# Patient Record
Sex: FEMALE | Race: WHITE | Employment: FULL TIME | URBAN - METROPOLITAN AREA
[De-identification: names, ages, dates, MRNs, and addresses within clinical notes are randomized per-mention and may not be internally consistent; named-entity substitution may affect disease eponyms.]

---

## 2017-06-21 ENCOUNTER — PATIENT MESSAGE (OUTPATIENT)
Dept: OBGYN CLINIC | Facility: CLINIC | Age: 42
End: 2017-06-21

## 2017-06-21 ENCOUNTER — APPOINTMENT (OUTPATIENT)
Dept: LAB | Facility: REFERENCE LAB | Age: 42
End: 2017-06-21
Attending: OBSTETRICS & GYNECOLOGY
Payer: COMMERCIAL

## 2017-06-21 ENCOUNTER — OFFICE VISIT (OUTPATIENT)
Dept: OBGYN CLINIC | Facility: CLINIC | Age: 42
End: 2017-06-21

## 2017-06-21 VITALS — WEIGHT: 153 LBS | BODY MASS INDEX: 24.59 KG/M2 | HEIGHT: 66 IN

## 2017-06-21 DIAGNOSIS — Z32.01 PREGNANCY EXAMINATION OR TEST, POSITIVE RESULT: ICD-10-CM

## 2017-06-21 DIAGNOSIS — Z12.4 ROUTINE CERVICAL SMEAR: ICD-10-CM

## 2017-06-21 DIAGNOSIS — Z11.3 SCREENING EXAMINATION FOR VENEREAL DISEASE: ICD-10-CM

## 2017-06-21 DIAGNOSIS — Z01.419 WOMEN'S ANNUAL ROUTINE GYNECOLOGICAL EXAMINATION: ICD-10-CM

## 2017-06-21 DIAGNOSIS — Z32.01 PREGNANCY EXAMINATION OR TEST, POSITIVE RESULT: Primary | ICD-10-CM

## 2017-06-21 PROCEDURE — 84144 ASSAY OF PROGESTERONE: CPT

## 2017-06-21 PROCEDURE — 99385 PREV VISIT NEW AGE 18-39: CPT | Performed by: OBSTETRICS & GYNECOLOGY

## 2017-06-21 PROCEDURE — 87491 CHLMYD TRACH DNA AMP PROBE: CPT | Performed by: OBSTETRICS & GYNECOLOGY

## 2017-06-21 PROCEDURE — 87591 N.GONORRHOEAE DNA AMP PROB: CPT | Performed by: OBSTETRICS & GYNECOLOGY

## 2017-06-21 PROCEDURE — 84702 CHORIONIC GONADOTROPIN TEST: CPT

## 2017-06-21 PROCEDURE — 87624 HPV HI-RISK TYP POOLED RSLT: CPT | Performed by: OBSTETRICS & GYNECOLOGY

## 2017-06-21 PROCEDURE — 81025 URINE PREGNANCY TEST: CPT | Performed by: OBSTETRICS & GYNECOLOGY

## 2017-06-21 PROCEDURE — 36415 COLL VENOUS BLD VENIPUNCTURE: CPT

## 2017-06-21 PROCEDURE — 88175 CYTOPATH C/V AUTO FLUID REDO: CPT | Performed by: OBSTETRICS & GYNECOLOGY

## 2017-06-21 NOTE — PROGRESS NOTES
GYN H&P     2017  1:28 PM    CC: Patient is here to establish care    HPI: Patient is a 46year old  LMP 2017 EGA 5.0 weeks [facility] 2018 here for annual exam and confirmation of pregnancy. Office UCG positive today.  Cycles regular since off developed, well nourished, in no apparent distress  SKIN: no rashes, no lesions  HEENT: normal  LUNGS: respiration unlabored  CARDIOVASCULAR: no peripheral edema or varicosities, skin warm and dry  BREASTS: bilaterally nontender, no palpable masses, no nip

## 2017-06-23 ENCOUNTER — LAB ENCOUNTER (OUTPATIENT)
Dept: LAB | Facility: REFERENCE LAB | Age: 42
End: 2017-06-23
Attending: OBSTETRICS & GYNECOLOGY
Payer: COMMERCIAL

## 2017-06-23 DIAGNOSIS — Z32.01 PREGNANCY EXAMINATION OR TEST, POSITIVE RESULT: ICD-10-CM

## 2017-06-23 PROCEDURE — 36415 COLL VENOUS BLD VENIPUNCTURE: CPT | Performed by: OBSTETRICS & GYNECOLOGY

## 2017-06-23 PROCEDURE — 84144 ASSAY OF PROGESTERONE: CPT | Performed by: OBSTETRICS & GYNECOLOGY

## 2017-06-23 PROCEDURE — 84702 CHORIONIC GONADOTROPIN TEST: CPT | Performed by: OBSTETRICS & GYNECOLOGY

## 2017-07-11 ENCOUNTER — OFFICE VISIT (OUTPATIENT)
Dept: OBGYN CLINIC | Facility: CLINIC | Age: 42
End: 2017-07-11

## 2017-07-11 VITALS — BODY MASS INDEX: 25 KG/M2 | SYSTOLIC BLOOD PRESSURE: 110 MMHG | DIASTOLIC BLOOD PRESSURE: 60 MMHG | WEIGHT: 154.5 LBS

## 2017-07-11 DIAGNOSIS — O09.511: Primary | ICD-10-CM

## 2017-07-11 PROBLEM — O09.519 ELDERLY PRIMIGRAVIDA, ANTEPARTUM: Status: ACTIVE | Noted: 2017-07-11

## 2017-07-11 PROCEDURE — 99213 OFFICE O/P EST LOW 20 MIN: CPT | Performed by: OBSTETRICS & GYNECOLOGY

## 2017-07-11 NOTE — PROGRESS NOTES
Gerald Brochure is a 46year old female. HPI:   Patient presents with: Follow - Up: on labs Usn appointment scheduled for 07/13/2017      Here to review labs, await USN to confirm EDC and viability. No bleeding or cramping.  Serial quant HCG and P

## 2017-07-13 ENCOUNTER — ULTRASOUND ENCOUNTER (OUTPATIENT)
Dept: OBGYN CLINIC | Facility: CLINIC | Age: 42
End: 2017-07-13

## 2017-07-13 DIAGNOSIS — Z32.01 PREGNANCY EXAMINATION OR TEST, POSITIVE RESULT: ICD-10-CM

## 2017-07-13 PROCEDURE — 76817 TRANSVAGINAL US OBSTETRIC: CPT | Performed by: OBSTETRICS & GYNECOLOGY

## 2017-07-13 PROCEDURE — 76801 OB US < 14 WKS SINGLE FETUS: CPT | Performed by: OBSTETRICS & GYNECOLOGY

## 2017-07-21 ENCOUNTER — LAB ENCOUNTER (OUTPATIENT)
Dept: LAB | Facility: REFERENCE LAB | Age: 42
End: 2017-07-21
Attending: OBSTETRICS & GYNECOLOGY
Payer: COMMERCIAL

## 2017-07-21 ENCOUNTER — TELEPHONE (OUTPATIENT)
Dept: OBGYN CLINIC | Facility: CLINIC | Age: 42
End: 2017-07-21

## 2017-07-21 ENCOUNTER — OFFICE VISIT (OUTPATIENT)
Dept: OBGYN CLINIC | Facility: CLINIC | Age: 42
End: 2017-07-21

## 2017-07-21 VITALS
WEIGHT: 154.81 LBS | SYSTOLIC BLOOD PRESSURE: 120 MMHG | DIASTOLIC BLOOD PRESSURE: 60 MMHG | HEIGHT: 66 IN | BODY MASS INDEX: 24.88 KG/M2

## 2017-07-21 DIAGNOSIS — O09.511: ICD-10-CM

## 2017-07-21 DIAGNOSIS — Z36.9 ENCOUNTER FOR ANTENATAL SCREENING OF MOTHER: Primary | ICD-10-CM

## 2017-07-21 DIAGNOSIS — O09.511 ADVANCED MATERNAL AGE, 1ST PREGNANCY, FIRST TRIMESTER: ICD-10-CM

## 2017-07-21 DIAGNOSIS — Z36.9 ENCOUNTER FOR ANTENATAL SCREENING OF MOTHER: ICD-10-CM

## 2017-07-21 LAB
ANTIBODY SCREEN: NEGATIVE
APPEARANCE: CLEAR
BASOPHILS # BLD: 0 K/UL (ref 0–0.2)
BASOPHILS NFR BLD: 0 %
BILIRUB UR QL: NEGATIVE
BILIRUBIN: NEGATIVE
COLOR UR: YELLOW
EOSINOPHIL # BLD: 0.1 K/UL (ref 0–0.7)
EOSINOPHIL NFR BLD: 1 %
ERYTHROCYTE [DISTWIDTH] IN BLOOD BY AUTOMATED COUNT: 12 % (ref 11–15)
GLUCOSE (URINE DIPSTICK): NEGATIVE MG/DL
GLUCOSE UR-MCNC: NEGATIVE MG/DL
HCT VFR BLD AUTO: 39.4 % (ref 35–48)
HGB BLD-MCNC: 13.7 G/DL (ref 12–16)
HGB UR QL STRIP.AUTO: NEGATIVE
KETONES UR-MCNC: NEGATIVE MG/DL
LYMPHOCYTES # BLD: 1.5 K/UL (ref 1–4)
LYMPHOCYTES NFR BLD: 20 %
MCH RBC QN AUTO: 33.6 PG (ref 27–32)
MCHC RBC AUTO-ENTMCNC: 34.9 G/DL (ref 32–37)
MCV RBC AUTO: 96.5 FL (ref 80–100)
MONOCYTES # BLD: 0.4 K/UL (ref 0–1)
MONOCYTES NFR BLD: 6 %
MULTISTIX LOT#: NORMAL NUMERIC
NEUTROPHILS # BLD AUTO: 5.2 K/UL (ref 1.8–7.7)
NEUTROPHILS NFR BLD: 73 %
NITRITE UR QL STRIP.AUTO: NEGATIVE
PH UR: 6 [PH] (ref 5–8)
PH, URINE: 7 (ref 4.5–8)
PLATELET # BLD AUTO: 228 K/UL (ref 140–400)
PMV BLD AUTO: 9.5 FL (ref 7.4–10.3)
PROT UR-MCNC: NEGATIVE MG/DL
RBC # BLD AUTO: 4.08 M/UL (ref 3.7–5.4)
RBC #/AREA URNS AUTO: <1 /HPF
RUBV IGG SER-ACNC: >500 IU/ML
SP GR UR STRIP: 1.02 (ref 1–1.03)
SPECIFIC GRAVITY: 1.02 (ref 1–1.03)
UROBILINOGEN UR STRIP-ACNC: <2
UROBILINOGEN,SEMI-QN: 0.2 MG/DL (ref 0–1.9)
VIT C UR-MCNC: NEGATIVE MG/DL
WBC # BLD AUTO: 7.2 K/UL (ref 4–11)
WBC #/AREA URNS AUTO: 1 /HPF

## 2017-07-21 PROCEDURE — 36415 COLL VENOUS BLD VENIPUNCTURE: CPT | Performed by: OBSTETRICS & GYNECOLOGY

## 2017-07-21 PROCEDURE — 86850 RBC ANTIBODY SCREEN: CPT | Performed by: OBSTETRICS & GYNECOLOGY

## 2017-07-21 PROCEDURE — 81001 URINALYSIS AUTO W/SCOPE: CPT | Performed by: OBSTETRICS & GYNECOLOGY

## 2017-07-21 PROCEDURE — 86762 RUBELLA ANTIBODY: CPT | Performed by: OBSTETRICS & GYNECOLOGY

## 2017-07-21 PROCEDURE — 81002 URINALYSIS NONAUTO W/O SCOPE: CPT | Performed by: OBSTETRICS & GYNECOLOGY

## 2017-07-21 PROCEDURE — 81220 CFTR GENE COM VARIANTS: CPT | Performed by: OBSTETRICS & GYNECOLOGY

## 2017-07-21 PROCEDURE — 87340 HEPATITIS B SURFACE AG IA: CPT | Performed by: OBSTETRICS & GYNECOLOGY

## 2017-07-21 PROCEDURE — 85025 COMPLETE CBC W/AUTO DIFF WBC: CPT | Performed by: OBSTETRICS & GYNECOLOGY

## 2017-07-21 PROCEDURE — 87389 HIV-1 AG W/HIV-1&-2 AB AG IA: CPT | Performed by: OBSTETRICS & GYNECOLOGY

## 2017-07-21 PROCEDURE — 86780 TREPONEMA PALLIDUM: CPT | Performed by: OBSTETRICS & GYNECOLOGY

## 2017-07-21 PROCEDURE — 87086 URINE CULTURE/COLONY COUNT: CPT | Performed by: OBSTETRICS & GYNECOLOGY

## 2017-07-21 NOTE — TELEPHONE ENCOUNTER
----- Message from Deborah Polanco sent at 7/21/2017 10:43 AM CDT -----  Regarding: RE: Visit Follow-up Question  Contact: [phone]  Hi Dr. Andria Mackey,  Thanks for your time and info today. I'm glad Anthony Watters could be there.  I schedule the next visit for bryan continue taking? In college, I was diagnosed with depression and anxiety disorders. Does this put me at higher risk for postpartum depression? I haven't taken anti-depressants in over 10 years; after stopping those drugs, I pursued natural remedies.   I'm

## 2017-07-21 NOTE — TELEPHONE ENCOUNTER
Spoke to pt re: her recent appts that she scheduled. Pt scheduled her f/u appt a little too soon, rescheduled her for 8/21/17. Scheduled pt for labs as well.

## 2017-07-21 NOTE — PROGRESS NOTES
Here for NOB visit. Counseling and prenatal assessment done.  Discussed genetic screening options, nutrition, prenatal vitamins, activity, lifestyle habits, exercise, travel, and avoidence of extreme heat and toxins such as nicotine, alcohol, and pesticides

## 2017-07-24 LAB
HBV SURFACE AG SERPL QL IA: NONREACTIVE
HIV1+2 AB SERPL QL IA: NONREACTIVE
T PALLIDUM AB SER QL: NEGATIVE

## 2017-07-27 ENCOUNTER — NURSE ONLY (OUTPATIENT)
Dept: OBGYN CLINIC | Facility: CLINIC | Age: 42
End: 2017-07-27

## 2017-07-27 ENCOUNTER — APPOINTMENT (OUTPATIENT)
Dept: LAB | Facility: REFERENCE LAB | Age: 42
End: 2017-07-27
Attending: OBSTETRICS & GYNECOLOGY
Payer: COMMERCIAL

## 2017-07-27 DIAGNOSIS — Z36.9 ENCOUNTER FOR ANTENATAL SCREENING OF MOTHER: Primary | ICD-10-CM

## 2017-07-27 LAB — RH BLOOD TYPE: POSITIVE

## 2017-07-27 PROCEDURE — 86900 BLOOD TYPING SEROLOGIC ABO: CPT | Performed by: OBSTETRICS & GYNECOLOGY

## 2017-07-27 PROCEDURE — 86901 BLOOD TYPING SEROLOGIC RH(D): CPT | Performed by: OBSTETRICS & GYNECOLOGY

## 2017-07-27 NOTE — TELEPHONE ENCOUNTER
Pt c/o slight stiffness in hand after blood draw today. Had slight numbness, but has improved. Instructed to open and close hand to increase circulation, call back if increasing numbness or tingling.

## 2017-07-28 LAB
CYSTIC FIBROSIS ALLELE 1: NEGATIVE
CYSTIC FIBROSIS, ALLELE 2: NEGATIVE

## 2017-08-04 ENCOUNTER — TELEPHONE (OUTPATIENT)
Dept: OBGYN CLINIC | Facility: CLINIC | Age: 42
End: 2017-08-04

## 2017-08-12 ENCOUNTER — TELEPHONE (OUTPATIENT)
Dept: OBGYN CLINIC | Facility: CLINIC | Age: 42
End: 2017-08-12

## 2017-08-12 NOTE — TELEPHONE ENCOUNTER
Pt states that Dr. Georgia Colmenares recommended MES to see pt regarding Water Birth. Pt is thinking about having a waterbirth . Appt scheduled, address and parking info given. Pt verbalized understanding and agrees with plan.

## 2017-08-14 NOTE — TELEPHONE ENCOUNTER
Pt with some questions re: her lab bill. LM for pt that David Sheldon will call her back when she returns to the office on 8/16/17. Left message with David Sheldon.

## 2017-08-21 ENCOUNTER — OFFICE VISIT (OUTPATIENT)
Dept: OBGYN CLINIC | Facility: CLINIC | Age: 42
End: 2017-08-21

## 2017-08-21 VITALS
BODY MASS INDEX: 25.31 KG/M2 | DIASTOLIC BLOOD PRESSURE: 64 MMHG | HEIGHT: 66 IN | SYSTOLIC BLOOD PRESSURE: 120 MMHG | WEIGHT: 157.5 LBS

## 2017-08-21 DIAGNOSIS — N39.0 URINARY TRACT INFECTION WITHOUT HEMATURIA, SITE UNSPECIFIED: ICD-10-CM

## 2017-08-21 DIAGNOSIS — Z36.9 ENCOUNTER FOR ANTENATAL SCREENING OF MOTHER: Primary | ICD-10-CM

## 2017-08-21 LAB
BILIRUBIN: NEGATIVE
GLUCOSE (URINE DIPSTICK): NEGATIVE MG/DL
KETONES (URINE DIPSTICK): NEGATIVE MG/DL
MULTISTIX LOT#: NORMAL NUMERIC
NITRITE, URINE: NEGATIVE
PH, URINE: 5 (ref 4.5–8)
SPECIFIC GRAVITY: 1.02 (ref 1–1.03)
UROBILINOGEN,SEMI-QN: 0.2 MG/DL (ref 0–1.9)

## 2017-08-21 PROCEDURE — 87086 URINE CULTURE/COLONY COUNT: CPT | Performed by: OBSTETRICS & GYNECOLOGY

## 2017-08-21 PROCEDURE — 81002 URINALYSIS NONAUTO W/O SCOPE: CPT | Performed by: OBSTETRICS & GYNECOLOGY

## 2017-08-21 NOTE — PROGRESS NOTES
Has consultation with midwives this week. No ob complaints although did have some light pink spotting last week without cramping that has resolved. +FHTs today. Reports increased urinary frequency. Will check UC&S today.

## 2017-08-23 ENCOUNTER — OFFICE VISIT (OUTPATIENT)
Dept: OBGYN CLINIC | Facility: CLINIC | Age: 42
End: 2017-08-23

## 2017-08-23 DIAGNOSIS — Z71.89 PRENATAL CONSULT: Primary | ICD-10-CM

## 2017-08-23 NOTE — PROGRESS NOTES
Pt. denies any medical conditions. Desires CNM care. Midwifery care & philosophy discussed. Practice guidelines discussed. Pt is appropriate for RN visit.

## 2017-09-01 ENCOUNTER — NURSE ONLY (OUTPATIENT)
Dept: OBGYN CLINIC | Facility: CLINIC | Age: 42
End: 2017-09-01

## 2017-09-01 VITALS — BODY MASS INDEX: 25.72 KG/M2 | WEIGHT: 158.13 LBS | HEIGHT: 65.75 IN

## 2017-09-01 DIAGNOSIS — O09.522 AMA (ADVANCED MATERNAL AGE) MULTIGRAVIDA 35+, SECOND TRIMESTER: Primary | ICD-10-CM

## 2017-09-01 NOTE — PROGRESS NOTES
Pt is a Transfer of Care at 15.3 weeks. Pt is AMA and she has a cat. Pt does change the litter box. Encouraged pt not to change the litter box. 1 HR GTT, HA1C, TSH and TOXO titers ordered with missing NOB Labs.   Referral entered and faxed with order to

## 2017-09-13 ENCOUNTER — TELEPHONE (OUTPATIENT)
Dept: OBGYN CLINIC | Facility: CLINIC | Age: 42
End: 2017-09-13

## 2017-09-13 ENCOUNTER — INITIAL PRENATAL (OUTPATIENT)
Dept: OBGYN CLINIC | Facility: CLINIC | Age: 42
End: 2017-09-13

## 2017-09-13 VITALS
SYSTOLIC BLOOD PRESSURE: 104 MMHG | BODY MASS INDEX: 26 KG/M2 | DIASTOLIC BLOOD PRESSURE: 71 MMHG | WEIGHT: 160 LBS | HEART RATE: 67 BPM

## 2017-09-13 DIAGNOSIS — O09.522 ELDERLY MULTIGRAVIDA IN SECOND TRIMESTER: ICD-10-CM

## 2017-09-13 DIAGNOSIS — Z34.02 ENCOUNTER FOR SUPERVISION OF NORMAL FIRST PREGNANCY IN SECOND TRIMESTER: Primary | ICD-10-CM

## 2017-09-13 LAB
APPEARANCE: CLEAR
MULTISTIX LOT#: ABNORMAL NUMERIC
PH, URINE: 6 (ref 4.5–8)
SPECIFIC GRAVITY: 1 (ref 1–1.03)
UROBILINOGEN,SEMI-QN: 0.2 MG/DL (ref 0–1.9)

## 2017-09-13 NOTE — PROGRESS NOTES
Pt reports + FM. Has had on and off pinkish vaginal discharge last time 10 days ago after intercourse.   Reviewed with pt warning signs Level 2 u/s and MFM consult scheduled for 10/13  All questions answered and warning signs reviewed

## 2017-10-02 ENCOUNTER — TELEPHONE (OUTPATIENT)
Dept: OBGYN CLINIC | Facility: CLINIC | Age: 42
End: 2017-10-02

## 2017-10-02 NOTE — TELEPHONE ENCOUNTER
Msg left on VM requesting pt to complete the OB Labs ordered at her [facility] Education visit. Requested pt to call back with any further questions or concerns.

## 2017-10-05 ENCOUNTER — APPOINTMENT (OUTPATIENT)
Dept: LAB | Age: 42
End: 2017-10-05
Attending: ADVANCED PRACTICE MIDWIFE
Payer: COMMERCIAL

## 2017-10-05 DIAGNOSIS — O09.522 AMA (ADVANCED MATERNAL AGE) MULTIGRAVIDA 35+, SECOND TRIMESTER: ICD-10-CM

## 2017-10-05 PROCEDURE — 36415 COLL VENOUS BLD VENIPUNCTURE: CPT

## 2017-10-05 PROCEDURE — 82950 GLUCOSE TEST: CPT

## 2017-10-05 PROCEDURE — 86778 TOXOPLASMA ANTIBODY IGM: CPT

## 2017-10-05 PROCEDURE — 83036 HEMOGLOBIN GLYCOSYLATED A1C: CPT

## 2017-10-05 PROCEDURE — 86777 TOXOPLASMA ANTIBODY: CPT

## 2017-10-05 PROCEDURE — 84443 ASSAY THYROID STIM HORMONE: CPT

## 2017-10-05 PROCEDURE — 82306 VITAMIN D 25 HYDROXY: CPT

## 2017-10-05 PROCEDURE — 82105 ALPHA-FETOPROTEIN SERUM: CPT

## 2017-10-05 PROCEDURE — 86803 HEPATITIS C AB TEST: CPT

## 2017-10-09 ENCOUNTER — PATIENT MESSAGE (OUTPATIENT)
Dept: OBGYN CLINIC | Facility: CLINIC | Age: 42
End: 2017-10-09

## 2017-10-12 NOTE — PROGRESS NOTES
Outpatient Maternal-Fetal Medicine Consultation    Dear Ms. Triston Huang you for requesting ultrasound evaluation and maternal fetal medicine consultation on your patient Iker Hopkins.   As you are aware she is a 46year old female with a Singl tab for the detailed report.         DISCUSSION  During her visit we discussed and reviewed the following issues:  ADVANCED MATERNAL AGE    Background  I reviewed with the patient that pregnancies in women of advanced maternal age (28 or older at delivery) percent in women age 48 to 61.           Fetal Death        A decision analysis tool using data from the [place] [employer]  Database predicted a strategy of weekly antepartum testing and labor induction would lower the risk of unexplained fetal virgil amniocentesis). Non-invasive Pregnancy Testing (NIPT)  I reviewed current non-invasive screening options.  Currently non-invasive pregnancy testing (NIPT) offers the highest detection rate (with the lowest false positive rate) for the detection of fetal

## 2017-10-13 ENCOUNTER — ROUTINE PRENATAL (OUTPATIENT)
Dept: OBGYN CLINIC | Facility: CLINIC | Age: 42
End: 2017-10-13

## 2017-10-13 ENCOUNTER — HOSPITAL ENCOUNTER (OUTPATIENT)
Dept: PERINATAL CARE | Facility: HOSPITAL | Age: 42
Discharge: HOME | End: 2017-10-13
Attending: OBSTETRICS & GYNECOLOGY
Payer: COMMERCIAL

## 2017-10-13 VITALS
HEIGHT: 65.75 IN | WEIGHT: 161 LBS | DIASTOLIC BLOOD PRESSURE: 85 MMHG | BODY MASS INDEX: 26.19 KG/M2 | SYSTOLIC BLOOD PRESSURE: 129 MMHG | HEART RATE: 76 BPM

## 2017-10-13 VITALS
HEART RATE: 88 BPM | WEIGHT: 165 LBS | SYSTOLIC BLOOD PRESSURE: 134 MMHG | BODY MASS INDEX: 27 KG/M2 | DIASTOLIC BLOOD PRESSURE: 75 MMHG

## 2017-10-13 DIAGNOSIS — O09.519 ELDERLY PRIMIGRAVIDA, ANTEPARTUM: Primary | ICD-10-CM

## 2017-10-13 DIAGNOSIS — Z36.3 SCREENING, ANTENATAL, FOR MALFORMATION BY ULTRASOUND: ICD-10-CM

## 2017-10-13 DIAGNOSIS — Z34.02 ENCOUNTER FOR SUPERVISION OF NORMAL FIRST PREGNANCY IN SECOND TRIMESTER: Primary | ICD-10-CM

## 2017-10-13 DIAGNOSIS — O09.519 ELDERLY PRIMIGRAVIDA, ANTEPARTUM: ICD-10-CM

## 2017-10-13 PROBLEM — Z28.21 INFLUENZA VACCINE REFUSED: Status: RESOLVED | Noted: 2017-10-13 | Resolved: 2017-10-13

## 2017-10-13 PROBLEM — Z28.21 INFLUENZA VACCINE REFUSED: Status: ACTIVE | Noted: 2017-10-13

## 2017-10-13 PROCEDURE — 76811 OB US DETAILED SNGL FETUS: CPT | Performed by: OBSTETRICS & GYNECOLOGY

## 2017-10-13 PROCEDURE — 90471 IMMUNIZATION ADMIN: CPT | Performed by: ADVANCED PRACTICE MIDWIFE

## 2017-10-13 PROCEDURE — 99243 OFF/OP CNSLTJ NEW/EST LOW 30: CPT | Performed by: OBSTETRICS & GYNECOLOGY

## 2017-10-13 PROCEDURE — 90686 IIV4 VACC NO PRSV 0.5 ML IM: CPT | Performed by: ADVANCED PRACTICE MIDWIFE

## 2017-10-13 NOTE — PROGRESS NOTES
Just had Level 2 with MFM, normal. + FM. Denies pain or bleeding. Questions about recommendations and differences in care d/t AMA. Recommendation for growth u/s, NST's and delivery by 40 wks reviewed. Doing yoga regularly. Flu vaccine per nurse today.

## 2017-11-10 ENCOUNTER — ROUTINE PRENATAL (OUTPATIENT)
Dept: OBGYN CLINIC | Facility: CLINIC | Age: 42
End: 2017-11-10

## 2017-11-10 VITALS
HEART RATE: 87 BPM | DIASTOLIC BLOOD PRESSURE: 79 MMHG | BODY MASS INDEX: 27.51 KG/M2 | WEIGHT: 169.13 LBS | SYSTOLIC BLOOD PRESSURE: 127 MMHG | HEIGHT: 65.75 IN

## 2017-11-10 DIAGNOSIS — Z34.02 ENCOUNTER FOR SUPERVISION OF NORMAL FIRST PREGNANCY IN SECOND TRIMESTER: Primary | ICD-10-CM

## 2017-11-10 PROBLEM — Z32.01 PREGNANCY EXAMINATION OR TEST, POSITIVE RESULT: Status: RESOLVED | Noted: 2017-06-21 | Resolved: 2017-11-10

## 2017-11-10 PROBLEM — Z01.419 WOMEN'S ANNUAL ROUTINE GYNECOLOGICAL EXAMINATION: Status: RESOLVED | Noted: 2017-06-21 | Resolved: 2017-11-10

## 2017-11-10 PROBLEM — Z36.9 ENCOUNTER FOR ANTENATAL SCREENING OF MOTHER: Status: RESOLVED | Noted: 2017-07-21 | Resolved: 2017-11-10

## 2017-11-10 NOTE — PROGRESS NOTES
Signed up for tour and CBE class. Discussed doulas. Discussed relief for insomnia. REv warnings/when to call.   To do 3rd T labs with nv

## 2017-11-29 ENCOUNTER — ROUTINE PRENATAL (OUTPATIENT)
Dept: OBGYN CLINIC | Facility: CLINIC | Age: 42
End: 2017-11-29

## 2017-11-29 ENCOUNTER — APPOINTMENT (OUTPATIENT)
Dept: LAB | Facility: HOSPITAL | Age: 42
End: 2017-11-29
Attending: ADVANCED PRACTICE MIDWIFE
Payer: COMMERCIAL

## 2017-11-29 VITALS
HEART RATE: 91 BPM | SYSTOLIC BLOOD PRESSURE: 121 MMHG | WEIGHT: 173 LBS | BODY MASS INDEX: 28 KG/M2 | DIASTOLIC BLOOD PRESSURE: 84 MMHG

## 2017-11-29 DIAGNOSIS — F41.9 ANXIETY: ICD-10-CM

## 2017-11-29 DIAGNOSIS — Z34.83 ENCOUNTER FOR SUPERVISION OF OTHER NORMAL PREGNANCY IN THIRD TRIMESTER: Primary | ICD-10-CM

## 2017-11-29 DIAGNOSIS — Z34.02 ENCOUNTER FOR SUPERVISION OF NORMAL FIRST PREGNANCY IN SECOND TRIMESTER: ICD-10-CM

## 2017-11-29 PROCEDURE — 85027 COMPLETE CBC AUTOMATED: CPT

## 2017-11-29 PROCEDURE — 81002 URINALYSIS NONAUTO W/O SCOPE: CPT | Performed by: ADVANCED PRACTICE MIDWIFE

## 2017-11-29 PROCEDURE — 90715 TDAP VACCINE 7 YRS/> IM: CPT | Performed by: ADVANCED PRACTICE MIDWIFE

## 2017-11-29 PROCEDURE — 90471 IMMUNIZATION ADMIN: CPT | Performed by: ADVANCED PRACTICE MIDWIFE

## 2017-11-29 PROCEDURE — 82950 GLUCOSE TEST: CPT

## 2017-11-29 NOTE — PROGRESS NOTES
Active fetus  No signs signs of PTL. Reviewed S&S of PTL  28 week pkg reviewed. Pt reports increasing diffculty with sleeping and anxiety. Pt is open to therapy. Steffi Trammell navigator contacted. Warning signs reviewed  All questions answered.

## 2017-11-30 ENCOUNTER — TELEPHONE (OUTPATIENT)
Dept: OBGYN CLINIC | Facility: CLINIC | Age: 42
End: 2017-11-30

## 2017-12-11 ENCOUNTER — HOSPITAL ENCOUNTER (OUTPATIENT)
Dept: PERINATAL CARE | Facility: HOSPITAL | Age: 42
Discharge: HOME | End: 2017-12-11
Attending: OBSTETRICS & GYNECOLOGY
Payer: COMMERCIAL

## 2017-12-11 ENCOUNTER — ROUTINE PRENATAL (OUTPATIENT)
Dept: OBGYN CLINIC | Facility: CLINIC | Age: 42
End: 2017-12-11

## 2017-12-11 VITALS
SYSTOLIC BLOOD PRESSURE: 116 MMHG | HEART RATE: 80 BPM | WEIGHT: 175 LBS | HEIGHT: 65.75 IN | BODY MASS INDEX: 28.46 KG/M2 | DIASTOLIC BLOOD PRESSURE: 76 MMHG

## 2017-12-11 VITALS
RESPIRATION RATE: 16 BRPM | HEIGHT: 66 IN | WEIGHT: 173 LBS | SYSTOLIC BLOOD PRESSURE: 133 MMHG | BODY MASS INDEX: 27.8 KG/M2 | DIASTOLIC BLOOD PRESSURE: 82 MMHG | HEART RATE: 86 BPM

## 2017-12-11 DIAGNOSIS — O09.519 ELDERLY PRIMIGRAVIDA, ANTEPARTUM: Primary | ICD-10-CM

## 2017-12-11 DIAGNOSIS — Z34.03 ENCOUNTER FOR SUPERVISION OF NORMAL FIRST PREGNANCY IN THIRD TRIMESTER: Primary | ICD-10-CM

## 2017-12-11 DIAGNOSIS — O09.519 ELDERLY PRIMIGRAVIDA, ANTEPARTUM: ICD-10-CM

## 2017-12-11 PROCEDURE — 76805 OB US >/= 14 WKS SNGL FETUS: CPT | Performed by: OBSTETRICS & GYNECOLOGY

## 2017-12-11 PROCEDURE — 99243 OFF/OP CNSLTJ NEW/EST LOW 30: CPT | Performed by: OBSTETRICS & GYNECOLOGY

## 2017-12-11 NOTE — PROGRESS NOTES
Outpatient Maternal-Fetal Medicine Consultation    Dear Ms. Lu Cassidy you for requesting ultrasound evaluation and maternal fetal medicine consultation on your patient Emmett Beards.   As you are aware she is a 38year old female with a Singl rule out all structural and chromosomal abnormalities. IMPRESSION:  · IUP at 29w6d   · Scan consistent with dates  · Advanced maternal age, low risk [place] screen. Amnio declined.   · Small anterior fibroid    RECOMMENDATIONS:  · Continue care with

## 2017-12-12 NOTE — PROGRESS NOTES
Active baby. Normal ultrasound. Reviewed recommendations from M. NSTs 34 weekly. Bi-weekly testing at 38 weeks. Delivery at 40 weeks.

## 2017-12-28 ENCOUNTER — ROUTINE PRENATAL (OUTPATIENT)
Dept: OBGYN CLINIC | Facility: CLINIC | Age: 42
End: 2017-12-28

## 2017-12-28 VITALS
SYSTOLIC BLOOD PRESSURE: 129 MMHG | BODY MASS INDEX: 29 KG/M2 | WEIGHT: 178.81 LBS | HEART RATE: 85 BPM | DIASTOLIC BLOOD PRESSURE: 78 MMHG

## 2017-12-28 DIAGNOSIS — Z34.03 ENCOUNTER FOR SUPERVISION OF NORMAL FIRST PREGNANCY IN THIRD TRIMESTER: Primary | ICD-10-CM

## 2017-12-28 NOTE — PROGRESS NOTES
Did one day preparring for birth class. Planning to do hypnobirthing with Worthy Beni. Baby active. No signs PTL. Discussed weekly NST's starting with next weeks visit. Kick counts reviewed.

## 2018-01-04 ENCOUNTER — TELEPHONE (OUTPATIENT)
Dept: ADMINISTRATIVE | Age: 43
End: 2018-01-04

## 2018-01-04 NOTE — TELEPHONE ENCOUNTER
Spoke w/ pt, paid $25 over the phone, receipt emailed to pt ([email or web address]). Pt will send back FMLA+ FCR+ Release - pending.  NK

## 2018-01-11 ENCOUNTER — HOSPITAL ENCOUNTER (OUTPATIENT)
Facility: HOSPITAL | Age: 43
Discharge: HOME | End: 2018-01-11
Attending: ADVANCED PRACTICE MIDWIFE | Admitting: OBSTETRICS & GYNECOLOGY
Payer: COMMERCIAL

## 2018-01-11 ENCOUNTER — ROUTINE PRENATAL (OUTPATIENT)
Dept: OBGYN CLINIC | Facility: CLINIC | Age: 43
End: 2018-01-11

## 2018-01-11 ENCOUNTER — HOSPITAL ENCOUNTER (OUTPATIENT)
Dept: OBGYN CLINIC | Facility: HOSPITAL | Age: 43
Discharge: HOME | End: 2018-01-11
Attending: ADVANCED PRACTICE MIDWIFE
Payer: COMMERCIAL

## 2018-01-11 VITALS
WEIGHT: 185 LBS | BODY MASS INDEX: 30 KG/M2 | DIASTOLIC BLOOD PRESSURE: 75 MMHG | SYSTOLIC BLOOD PRESSURE: 112 MMHG | HEART RATE: 98 BPM

## 2018-01-11 VITALS — HEART RATE: 81 BPM | DIASTOLIC BLOOD PRESSURE: 76 MMHG | SYSTOLIC BLOOD PRESSURE: 120 MMHG

## 2018-01-11 DIAGNOSIS — Z34.03 ENCOUNTER FOR SUPERVISION OF NORMAL FIRST PREGNANCY IN THIRD TRIMESTER: Primary | ICD-10-CM

## 2018-01-11 PROBLEM — O34.10 UTERINE FIBROID DURING PREGNANCY, ANTEPARTUM: Status: ACTIVE | Noted: 2018-01-11

## 2018-01-11 PROBLEM — D25.9 UTERINE FIBROID DURING PREGNANCY, ANTEPARTUM: Status: ACTIVE | Noted: 2018-01-11

## 2018-01-11 LAB
APPEARANCE: CLEAR
MULTISTIX LOT#: NORMAL NUMERIC
PH, URINE: 6 (ref 4.5–8)
SPECIFIC GRAVITY: 1 (ref 1–1.03)
URINE-COLOR: YELLOW
UROBILINOGEN,SEMI-QN: 0 MG/DL (ref 0–1.9)

## 2018-01-11 PROCEDURE — 59025 FETAL NON-STRESS TEST: CPT | Performed by: ADVANCED PRACTICE MIDWIFE

## 2018-01-11 PROCEDURE — 81002 URINALYSIS NONAUTO W/O SCOPE: CPT | Performed by: ADVANCED PRACTICE MIDWIFE

## 2018-01-11 NOTE — NST
Nonstress Test   Patient: Odilia Minors    Gestation: 34w2d    NST: AMA       Variability: Moderate           Accelerations: Yes           Decelerations: None            Baseline: 125 BPM           Uterine Irritability: No           Contractions: No

## 2018-01-11 NOTE — PROGRESS NOTES
Doing well. No complaints. +FM. Meeting with Kev Gibson. Rev NST recommendations and IOL 39-40 weeks. Pt requesting information about where to read up on the data to support this - advised to read EBB, Mag[place] database, ACOG.   Rev warnings/when to ca

## 2018-01-18 ENCOUNTER — ROUTINE PRENATAL (OUTPATIENT)
Dept: OBGYN CLINIC | Facility: CLINIC | Age: 43
End: 2018-01-18

## 2018-01-18 ENCOUNTER — HOSPITAL ENCOUNTER (OUTPATIENT)
Facility: HOSPITAL | Age: 43
Discharge: HOME | End: 2018-01-18
Attending: ADVANCED PRACTICE MIDWIFE | Admitting: OBSTETRICS & GYNECOLOGY
Payer: COMMERCIAL

## 2018-01-18 ENCOUNTER — APPOINTMENT (OUTPATIENT)
Dept: OBGYN CLINIC | Facility: HOSPITAL | Age: 43
End: 2018-01-18
Payer: COMMERCIAL

## 2018-01-18 VITALS
WEIGHT: 185 LBS | BODY MASS INDEX: 30 KG/M2 | SYSTOLIC BLOOD PRESSURE: 127 MMHG | HEART RATE: 91 BPM | DIASTOLIC BLOOD PRESSURE: 81 MMHG

## 2018-01-18 VITALS — HEART RATE: 85 BPM | SYSTOLIC BLOOD PRESSURE: 124 MMHG | DIASTOLIC BLOOD PRESSURE: 76 MMHG

## 2018-01-18 DIAGNOSIS — Z34.03 ENCOUNTER FOR SUPERVISION OF NORMAL FIRST PREGNANCY IN THIRD TRIMESTER: Primary | ICD-10-CM

## 2018-01-18 LAB
APPEARANCE: CLEAR
MULTISTIX LOT#: NORMAL NUMERIC
PH, URINE: 5 (ref 4.5–8)
SPECIFIC GRAVITY: 1.01 (ref 1–1.03)
URINE-COLOR: YELLOW
UROBILINOGEN,SEMI-QN: 0 MG/DL (ref 0–1.9)

## 2018-01-18 PROCEDURE — 81002 URINALYSIS NONAUTO W/O SCOPE: CPT | Performed by: ADVANCED PRACTICE MIDWIFE

## 2018-01-18 PROCEDURE — 59025 FETAL NON-STRESS TEST: CPT | Performed by: ADVANCED PRACTICE MIDWIFE

## 2018-01-18 NOTE — NST
Nonstress Test   Patient: Keke Tamayo    Gestation: 35w2d    NST:       Variability: Moderate           Accelerations: Yes           Decelerations: None            Baseline: 125 BPM           Uterine Irritability: No           Contractions: Not pr

## 2018-01-18 NOTE — PROGRESS NOTES
No complaints, bit stuffy but no fever. NST today. Using Josph Simrichard as a . Rev GBS info, culture at nv. Pt with concerns about IOL.   Discussed data, methods to promote cervical ripening, sweeping membranes at term, and will discuss further at late

## 2018-01-20 ENCOUNTER — HOSPITAL ENCOUNTER (OUTPATIENT)
Facility: HOSPITAL | Age: 43
Setting detail: OBSERVATION
Discharge: HOME | End: 2018-01-20
Attending: ADVANCED PRACTICE MIDWIFE | Admitting: OBSTETRICS & GYNECOLOGY
Payer: COMMERCIAL

## 2018-01-20 VITALS
RESPIRATION RATE: 18 BRPM | TEMPERATURE: 98 F | DIASTOLIC BLOOD PRESSURE: 79 MMHG | HEART RATE: 80 BPM | SYSTOLIC BLOOD PRESSURE: 131 MMHG

## 2018-01-20 PROCEDURE — 59025 FETAL NON-STRESS TEST: CPT | Performed by: ADVANCED PRACTICE MIDWIFE

## 2018-01-21 NOTE — TRIAGE
[facility]      Triage Note    [place] Moat Patient Status:  Observation    1975 MRN [id number]   Location [address] [place] [address] Attending Soniya Arango, [address] [facility] Day # 0 PCP NAOMY Lakhani Patient denies history of fever with symptoms and VS in [facility] are WNL. Patient reports feeling fetal movement during NST. Kush Chu notified, patient is able to go home.   Patient educated about what to expect in terms of fetal movement at this point in pre

## 2018-01-24 ENCOUNTER — APPOINTMENT (OUTPATIENT)
Dept: OBGYN CLINIC | Facility: HOSPITAL | Age: 43
End: 2018-01-24
Payer: COMMERCIAL

## 2018-01-24 ENCOUNTER — HOSPITAL ENCOUNTER (OUTPATIENT)
Facility: HOSPITAL | Age: 43
Discharge: HOME | End: 2018-01-24
Attending: ADVANCED PRACTICE MIDWIFE | Admitting: OBSTETRICS & GYNECOLOGY
Payer: COMMERCIAL

## 2018-01-24 ENCOUNTER — ROUTINE PRENATAL (OUTPATIENT)
Dept: OBGYN CLINIC | Facility: CLINIC | Age: 43
End: 2018-01-24

## 2018-01-24 VITALS — HEART RATE: 83 BPM | SYSTOLIC BLOOD PRESSURE: 120 MMHG | DIASTOLIC BLOOD PRESSURE: 78 MMHG

## 2018-01-24 VITALS
DIASTOLIC BLOOD PRESSURE: 85 MMHG | HEART RATE: 89 BPM | BODY MASS INDEX: 30.27 KG/M2 | SYSTOLIC BLOOD PRESSURE: 129 MMHG | HEIGHT: 65.75 IN | WEIGHT: 186.13 LBS

## 2018-01-24 DIAGNOSIS — Z34.03 ENCOUNTER FOR SUPERVISION OF NORMAL FIRST PREGNANCY IN THIRD TRIMESTER: Primary | ICD-10-CM

## 2018-01-24 PROBLEM — Z34.90 PREGNANCY: Status: ACTIVE | Noted: 2018-01-24

## 2018-01-24 LAB
MULTISTIX LOT#: NORMAL NUMERIC
PH, URINE: 6.5 (ref 4.5–8)
SPECIFIC GRAVITY: 1 (ref 1–1.03)
URINE-COLOR: YELLOW
UROBILINOGEN,SEMI-QN: 0.2 MG/DL (ref 0–1.9)

## 2018-01-24 PROCEDURE — 59025 FETAL NON-STRESS TEST: CPT | Performed by: ADVANCED PRACTICE MIDWIFE

## 2018-01-24 PROCEDURE — 81002 URINALYSIS NONAUTO W/O SCOPE: CPT | Performed by: ADVANCED PRACTICE MIDWIFE

## 2018-01-25 NOTE — NST
Nonstress Test   Patient: Erlinda Hernandez    Gestation: 36w1d    NST:       Variability: Moderate           Accelerations: Yes           Decelerations: None            Baseline: 125 BPM           Uterine Irritability: No           Contractions: Irregu

## 2018-01-25 NOTE — PROGRESS NOTES
Active fetus Increasing BH contractions. Denies any leaking of fluid or bleeding. Reviewed S&S labor  Warning signs reviewed  All questions answered.

## 2018-02-01 ENCOUNTER — ROUTINE PRENATAL (OUTPATIENT)
Dept: OBGYN CLINIC | Facility: CLINIC | Age: 43
End: 2018-02-01

## 2018-02-01 ENCOUNTER — APPOINTMENT (OUTPATIENT)
Dept: OBGYN CLINIC | Facility: HOSPITAL | Age: 43
End: 2018-02-01
Payer: COMMERCIAL

## 2018-02-01 ENCOUNTER — HOSPITAL ENCOUNTER (OUTPATIENT)
Facility: HOSPITAL | Age: 43
Discharge: HOME | End: 2018-02-01
Attending: ADVANCED PRACTICE MIDWIFE | Admitting: OBSTETRICS & GYNECOLOGY
Payer: COMMERCIAL

## 2018-02-01 VITALS
HEART RATE: 90 BPM | WEIGHT: 188 LBS | DIASTOLIC BLOOD PRESSURE: 82 MMHG | BODY MASS INDEX: 31 KG/M2 | SYSTOLIC BLOOD PRESSURE: 122 MMHG

## 2018-02-01 VITALS — SYSTOLIC BLOOD PRESSURE: 127 MMHG | DIASTOLIC BLOOD PRESSURE: 79 MMHG | HEART RATE: 88 BPM

## 2018-02-01 DIAGNOSIS — Z34.03 ENCOUNTER FOR SUPERVISION OF NORMAL FIRST PREGNANCY IN THIRD TRIMESTER: Primary | ICD-10-CM

## 2018-02-01 LAB
APPEARANCE: CLEAR
MULTISTIX LOT#: NORMAL NUMERIC
PH, URINE: 7 (ref 4.5–8)
SPECIFIC GRAVITY: 1.01 (ref 1–1.03)
URINE-COLOR: YELLOW
UROBILINOGEN,SEMI-QN: 0 MG/DL (ref 0–1.9)

## 2018-02-01 PROCEDURE — 81002 URINALYSIS NONAUTO W/O SCOPE: CPT | Performed by: ADVANCED PRACTICE MIDWIFE

## 2018-02-01 PROCEDURE — 59025 FETAL NON-STRESS TEST: CPT | Performed by: ADVANCED PRACTICE MIDWIFE

## 2018-02-01 NOTE — PROGRESS NOTES
Has biweekly testing with BPP scheduled starting next week. Rev GBS neg. Pt considering declining 3rd T labs and doing on admission in labor. Discussed sweeping membranes starting with next visit. Pt will consider.    Is not sure whether she will con

## 2018-02-01 NOTE — NST
Nonstress Test   Patient: Emmett Cannon    Gestation: 37w2d    NST: ama       Variability: Moderate           Accelerations: Yes           Decelerations: None            Baseline: 135 BPM           Uterine Irritability: No           Contractions: Ir

## 2018-02-05 ENCOUNTER — ROUTINE PRENATAL (OUTPATIENT)
Dept: OBGYN CLINIC | Facility: CLINIC | Age: 43
End: 2018-02-05

## 2018-02-05 ENCOUNTER — TELEPHONE (OUTPATIENT)
Dept: OBGYN CLINIC | Facility: CLINIC | Age: 43
End: 2018-02-05

## 2018-02-05 ENCOUNTER — APPOINTMENT (OUTPATIENT)
Dept: OBGYN CLINIC | Facility: HOSPITAL | Age: 43
End: 2018-02-05
Attending: ADVANCED PRACTICE MIDWIFE
Payer: COMMERCIAL

## 2018-02-05 ENCOUNTER — HOSPITAL ENCOUNTER (OUTPATIENT)
Facility: HOSPITAL | Age: 43
Discharge: HOME | End: 2018-02-05
Attending: ADVANCED PRACTICE MIDWIFE | Admitting: OBSTETRICS & GYNECOLOGY
Payer: COMMERCIAL

## 2018-02-05 VITALS — DIASTOLIC BLOOD PRESSURE: 84 MMHG | SYSTOLIC BLOOD PRESSURE: 122 MMHG | HEART RATE: 80 BPM

## 2018-02-05 VITALS
BODY MASS INDEX: 31 KG/M2 | WEIGHT: 188 LBS | DIASTOLIC BLOOD PRESSURE: 80 MMHG | HEART RATE: 85 BPM | SYSTOLIC BLOOD PRESSURE: 127 MMHG

## 2018-02-05 DIAGNOSIS — Z34.03 ENCOUNTER FOR SUPERVISION OF NORMAL FIRST PREGNANCY IN THIRD TRIMESTER: Primary | ICD-10-CM

## 2018-02-05 LAB
MULTISTIX LOT#: NORMAL NUMERIC
PH, URINE: 6 (ref 4.5–8)
SPECIFIC GRAVITY: 1 (ref 1–1.03)
URINE-COLOR: YELLOW
UROBILINOGEN,SEMI-QN: 0.2 MG/DL (ref 0–1.9)

## 2018-02-05 PROCEDURE — 59025 FETAL NON-STRESS TEST: CPT | Performed by: ADVANCED PRACTICE MIDWIFE

## 2018-02-05 PROCEDURE — 81002 URINALYSIS NONAUTO W/O SCOPE: CPT | Performed by: ADVANCED PRACTICE MIDWIFE

## 2018-02-05 NOTE — PROGRESS NOTES
Feeling well, active baby. Still considering IV, reviewed risk associated with fibroid, will decide at next visit. To start twice weekly BPPs and NSTs next week.   Patient hesitant for IOL at 40 week, reviewed risks patient believes she will be comfortable

## 2018-02-05 NOTE — NST
Nonstress Test   Patient: Jericho Casey    Gestation: 37w6d    NST:       Variability: Moderate           Accelerations: Yes           Decelerations: None            Baseline: 125 BPM           Uterine Irritability: No           Contractions: Irregu

## 2018-02-05 NOTE — TELEPHONE ENCOUNTER
Disability form for [employer] received from pt via email. Logged for processing. Pt aware of 2nd charge will be billed.  NK

## 2018-02-08 ENCOUNTER — HOSPITAL ENCOUNTER (OUTPATIENT)
Dept: PERINATAL CARE | Facility: HOSPITAL | Age: 43
Discharge: HOME | End: 2018-02-08
Attending: OBSTETRICS & GYNECOLOGY
Payer: COMMERCIAL

## 2018-02-08 VITALS
SYSTOLIC BLOOD PRESSURE: 124 MMHG | HEART RATE: 104 BPM | HEIGHT: 66 IN | BODY MASS INDEX: 30.22 KG/M2 | DIASTOLIC BLOOD PRESSURE: 81 MMHG | WEIGHT: 188 LBS

## 2018-02-08 DIAGNOSIS — D25.9 UTERINE FIBROID DURING PREGNANCY, ANTEPARTUM: ICD-10-CM

## 2018-02-08 DIAGNOSIS — O34.10 UTERINE FIBROID DURING PREGNANCY, ANTEPARTUM: ICD-10-CM

## 2018-02-08 DIAGNOSIS — O09.519 ELDERLY PRIMIGRAVIDA, ANTEPARTUM: ICD-10-CM

## 2018-02-08 DIAGNOSIS — O09.519 ELDERLY PRIMIGRAVIDA, ANTEPARTUM: Primary | ICD-10-CM

## 2018-02-08 PROCEDURE — 76819 FETAL BIOPHYS PROFIL W/O NST: CPT | Performed by: OBSTETRICS & GYNECOLOGY

## 2018-02-08 PROCEDURE — 76815 OB US LIMITED FETUS(S): CPT | Performed by: OBSTETRICS & GYNECOLOGY

## 2018-02-08 PROCEDURE — 99243 OFF/OP CNSLTJ NEW/EST LOW 30: CPT | Performed by: OBSTETRICS & GYNECOLOGY

## 2018-02-08 PROCEDURE — 76805 OB US >/= 14 WKS SNGL FETUS: CPT | Performed by: OBSTETRICS & GYNECOLOGY

## 2018-02-08 NOTE — PROGRESS NOTES
Outpatient Maternal-Fetal Medicine Consultation     Dear Ms. Mcfadden,     Thank you for requesting ultrasound evaluation and maternal fetal medicine consultation on your patient Da Wheatley.   As you are aware she is a 38year old female with a Sin physicianMaria De Jesus Chauhan discussed that the fibroid is relatively small and not likely to interfere  with a normal labor and delivery. We discussed the use of pitocin in the third stage of labor to reduce retained placenta and maternal hemorrhage.       ROBERTO

## 2018-02-08 NOTE — ADDENDUM NOTE
Encounter addended by: Kee Dakins on: 2/8/2018  1:38 PM<BR>    Actions taken: Charge Capture section accepted

## 2018-02-12 ENCOUNTER — APPOINTMENT (OUTPATIENT)
Dept: OBGYN CLINIC | Facility: HOSPITAL | Age: 43
End: 2018-02-12
Payer: COMMERCIAL

## 2018-02-12 ENCOUNTER — HOSPITAL ENCOUNTER (OUTPATIENT)
Facility: HOSPITAL | Age: 43
Discharge: HOME | End: 2018-02-12
Attending: ADVANCED PRACTICE MIDWIFE | Admitting: OBSTETRICS & GYNECOLOGY
Payer: COMMERCIAL

## 2018-02-12 VITALS — SYSTOLIC BLOOD PRESSURE: 128 MMHG | DIASTOLIC BLOOD PRESSURE: 84 MMHG | HEART RATE: 72 BPM | RESPIRATION RATE: 18 BRPM

## 2018-02-12 PROCEDURE — 59025 FETAL NON-STRESS TEST: CPT | Performed by: ADVANCED PRACTICE MIDWIFE

## 2018-02-12 RX ORDER — DEXTROSE, SODIUM CHLORIDE, SODIUM LACTATE, POTASSIUM CHLORIDE, AND CALCIUM CHLORIDE 5; .6; .31; .03; .02 G/100ML; G/100ML; G/100ML; G/100ML; G/100ML
INJECTION, SOLUTION INTRAVENOUS
Status: DISCONTINUED
Start: 2018-02-12 | End: 2018-02-12

## 2018-02-12 NOTE — NST
Nonstress Test   Patient: Jericho Casey    Gestation: 38w6d    NST: ama        Variability: Moderate           Accelerations: Yes           Decelerations: None            Baseline: 120 BPM           Uterine Irritability: No           Contractions: I

## 2018-02-12 NOTE — TELEPHONE ENCOUNTER
Juan Vincent CNM    Please sign off on form:  -Highlight the patient and hit \"Chart\" button. -In Chart Review, w/in the Encounter tab - click 1 time on the Telephone call encounter for 2-5-18.  Scroll down the telephone encounter.  -Click \"scan on\" b

## 2018-02-13 ENCOUNTER — ROUTINE PRENATAL (OUTPATIENT)
Dept: OBGYN CLINIC | Facility: CLINIC | Age: 43
End: 2018-02-13

## 2018-02-13 VITALS
HEART RATE: 85 BPM | DIASTOLIC BLOOD PRESSURE: 78 MMHG | WEIGHT: 189 LBS | BODY MASS INDEX: 31 KG/M2 | SYSTOLIC BLOOD PRESSURE: 121 MMHG

## 2018-02-13 DIAGNOSIS — Z34.03 ENCOUNTER FOR SUPERVISION OF NORMAL FIRST PREGNANCY IN THIRD TRIMESTER: Primary | ICD-10-CM

## 2018-02-13 LAB
APPEARANCE: CLEAR
MULTISTIX LOT#: NORMAL NUMERIC
PH, URINE: 5 (ref 4.5–8)
SPECIFIC GRAVITY: 1 (ref 1–1.03)
URINE-COLOR: YELLOW
UROBILINOGEN,SEMI-QN: 0 MG/DL (ref 0–1.9)

## 2018-02-13 PROCEDURE — 81002 URINALYSIS NONAUTO W/O SCOPE: CPT | Performed by: ADVANCED PRACTICE MIDWIFE

## 2018-02-14 ENCOUNTER — HOSPITAL ENCOUNTER (OUTPATIENT)
Dept: PERINATAL CARE | Facility: HOSPITAL | Age: 43
Discharge: HOME | End: 2018-02-14
Attending: OBSTETRICS & GYNECOLOGY
Payer: COMMERCIAL

## 2018-02-14 DIAGNOSIS — O34.10 UTERINE FIBROID DURING PREGNANCY, ANTEPARTUM: ICD-10-CM

## 2018-02-14 DIAGNOSIS — D25.9 UTERINE FIBROID DURING PREGNANCY, ANTEPARTUM: ICD-10-CM

## 2018-02-14 DIAGNOSIS — O09.519 ELDERLY PRIMIGRAVIDA, ANTEPARTUM: ICD-10-CM

## 2018-02-14 DIAGNOSIS — O09.519 ELDERLY PRIMIGRAVIDA, ANTEPARTUM: Primary | ICD-10-CM

## 2018-02-14 PROCEDURE — 76805 OB US >/= 14 WKS SNGL FETUS: CPT | Performed by: OBSTETRICS & GYNECOLOGY

## 2018-02-14 PROCEDURE — 99243 OFF/OP CNSLTJ NEW/EST LOW 30: CPT | Performed by: OBSTETRICS & GYNECOLOGY

## 2018-02-14 PROCEDURE — 76819 FETAL BIOPHYS PROFIL W/O NST: CPT | Performed by: OBSTETRICS & GYNECOLOGY

## 2018-02-14 NOTE — PROGRESS NOTES
Outpatient Maternal-Fetal Medicine Consultation    Dear Ms. Guerrero Ruffing you for requesting ultrasound evaluation and maternal fetal medicine consultation on your patient David Freeman.   As you are aware she is a 38year old female with a Singl chromosomal abnormalities. IMPRESSION:  · IUP at 39w1d   · Scan consistent with dates  · Advanced maternal age, low risk [place] screen. Amnio declined. · Small anterior fibroid    RECOMMENDATIONS:  · Continue care with Ms. Bogdan CASILLAS's Twice wee

## 2018-02-14 NOTE — PROGRESS NOTES
Pt feeling well, no complaints. Had NST yesterday and is scheduled with MFM for repeat BPP tomorrow. Also has scheduled Acupuncture appt for later in week.     Reviewed with patient the MFM recommendation to deliver 39-40 weeks, and have previously provid

## 2018-02-14 NOTE — ADDENDUM NOTE
Encounter addended by: Jacque Lamb on: 2/14/2018  3:19 PM<BR>    Actions taken: Charge Capture section accepted

## 2018-02-15 ENCOUNTER — OFFICE VISIT (OUTPATIENT)
Dept: INTEGRATIVE MEDICINE | Facility: HOSPITAL | Age: 43
End: 2018-02-15
Attending: ADVANCED PRACTICE MIDWIFE

## 2018-02-15 DIAGNOSIS — Z3A.40 40 WEEKS GESTATION OF PREGNANCY: Primary | ICD-10-CM

## 2018-02-16 ENCOUNTER — APPOINTMENT (OUTPATIENT)
Dept: INTEGRATIVE MEDICINE | Facility: HOSPITAL | Age: 43
End: 2018-02-16
Attending: ADVANCED PRACTICE MIDWIFE

## 2018-02-19 ENCOUNTER — OFFICE VISIT (OUTPATIENT)
Dept: INTEGRATIVE MEDICINE | Facility: HOSPITAL | Age: 43
End: 2018-02-19
Attending: ADVANCED PRACTICE MIDWIFE

## 2018-02-19 ENCOUNTER — ROUTINE PRENATAL (OUTPATIENT)
Dept: OBGYN CLINIC | Facility: CLINIC | Age: 43
End: 2018-02-19

## 2018-02-19 VITALS — DIASTOLIC BLOOD PRESSURE: 83 MMHG | HEART RATE: 81 BPM | SYSTOLIC BLOOD PRESSURE: 127 MMHG

## 2018-02-19 DIAGNOSIS — Z3A.40 40 WEEKS GESTATION OF PREGNANCY: Primary | ICD-10-CM

## 2018-02-19 DIAGNOSIS — Z34.03 ENCOUNTER FOR SUPERVISION OF NORMAL FIRST PREGNANCY IN THIRD TRIMESTER: Primary | ICD-10-CM

## 2018-02-19 PROCEDURE — 81002 URINALYSIS NONAUTO W/O SCOPE: CPT | Performed by: ADVANCED PRACTICE MIDWIFE

## 2018-02-19 NOTE — PROGRESS NOTES
Feeling well, active baby. Has NST scheduled tomorrow. Reviewed labor promotion techniques, membranes swept today. Has been doing acupuncture. Reviewed AMA risks over 35 y/o and patient decides she will proceed with IOL at 41 weeks.   Reviewed methods of

## 2018-02-21 ENCOUNTER — HOSPITAL ENCOUNTER (OUTPATIENT)
Dept: PERINATAL CARE | Facility: HOSPITAL | Age: 43
Discharge: HOME | End: 2018-02-21
Attending: OBSTETRICS & GYNECOLOGY
Payer: COMMERCIAL

## 2018-02-21 ENCOUNTER — PATIENT MESSAGE (OUTPATIENT)
Dept: OBGYN CLINIC | Facility: CLINIC | Age: 43
End: 2018-02-21

## 2018-02-21 ENCOUNTER — HOSPITAL ENCOUNTER (OUTPATIENT)
Dept: PERINATAL CARE | Facility: HOSPITAL | Age: 43
Discharge: HOME | End: 2018-02-21
Attending: ADVANCED PRACTICE MIDWIFE
Payer: COMMERCIAL

## 2018-02-21 VITALS — DIASTOLIC BLOOD PRESSURE: 71 MMHG | SYSTOLIC BLOOD PRESSURE: 137 MMHG

## 2018-02-21 VITALS — SYSTOLIC BLOOD PRESSURE: 137 MMHG | DIASTOLIC BLOOD PRESSURE: 71 MMHG

## 2018-02-21 DIAGNOSIS — O48.0 POST TERM PREGNANCY OVER 40 WEEKS: ICD-10-CM

## 2018-02-21 DIAGNOSIS — O34.10 UTERINE FIBROID DURING PREGNANCY, ANTEPARTUM: ICD-10-CM

## 2018-02-21 DIAGNOSIS — D25.9 UTERINE FIBROID DURING PREGNANCY, ANTEPARTUM: ICD-10-CM

## 2018-02-21 DIAGNOSIS — O09.519 ELDERLY PRIMIGRAVIDA, ANTEPARTUM: ICD-10-CM

## 2018-02-21 DIAGNOSIS — O09.519 ELDERLY PRIMIGRAVIDA, ANTEPARTUM: Primary | ICD-10-CM

## 2018-02-21 PROCEDURE — 76819 FETAL BIOPHYS PROFIL W/O NST: CPT | Performed by: OBSTETRICS & GYNECOLOGY

## 2018-02-21 PROCEDURE — 99243 OFF/OP CNSLTJ NEW/EST LOW 30: CPT | Performed by: OBSTETRICS & GYNECOLOGY

## 2018-02-21 PROCEDURE — 59025 FETAL NON-STRESS TEST: CPT | Performed by: ADVANCED PRACTICE MIDWIFE

## 2018-02-21 NOTE — NST
Nonstress Test   Patient: Ignacio Sandra    Gestation: 40w1d    NST: ama     Variability: Moderate           Accelerations: Yes           Decelerations: None            Baseline: 120 BPM                       Contractions: Irregular

## 2018-02-21 NOTE — PROGRESS NOTES
Outpatient Maternal-Fetal Medicine Consultation     Dear Ms. Cheyanne Houston  Thank you for requesting ultrasound evaluation and maternal fetal medicine consultation on your patient [address] [facility] you are aware she is a 38year old female with a Sin  Amnio declined previously  · Small anterior fibroid     RECOMMENDATIONS:  · Continue care with Ms. Mcfadden  · Twice weekly testing - weekly NST and weekly BPP. · Delivery at 39-40 weeks.   We discussed ideal IOL timing and that the risk for  incr

## 2018-02-22 ENCOUNTER — OFFICE VISIT (OUTPATIENT)
Dept: INTEGRATIVE MEDICINE | Facility: HOSPITAL | Age: 43
End: 2018-02-22
Attending: ADVANCED PRACTICE MIDWIFE

## 2018-02-22 ENCOUNTER — ROUTINE PRENATAL (OUTPATIENT)
Dept: OBGYN CLINIC | Facility: CLINIC | Age: 43
End: 2018-02-22

## 2018-02-22 VITALS
BODY MASS INDEX: 31 KG/M2 | WEIGHT: 190 LBS | DIASTOLIC BLOOD PRESSURE: 85 MMHG | HEART RATE: 93 BPM | SYSTOLIC BLOOD PRESSURE: 134 MMHG

## 2018-02-22 DIAGNOSIS — Z3A.40 40 WEEKS GESTATION OF PREGNANCY: Primary | ICD-10-CM

## 2018-02-22 DIAGNOSIS — Z34.03 ENCOUNTER FOR SUPERVISION OF NORMAL FIRST PREGNANCY IN THIRD TRIMESTER: Primary | ICD-10-CM

## 2018-02-22 PROCEDURE — 81002 URINALYSIS NONAUTO W/O SCOPE: CPT | Performed by: ADVANCED PRACTICE MIDWIFE

## 2018-02-23 ENCOUNTER — OFFICE VISIT (OUTPATIENT)
Dept: INTEGRATIVE MEDICINE | Facility: HOSPITAL | Age: 43
End: 2018-02-23
Attending: ADVANCED PRACTICE MIDWIFE

## 2018-02-23 ENCOUNTER — TELEPHONE (OUTPATIENT)
Dept: OBGYN CLINIC | Facility: CLINIC | Age: 43
End: 2018-02-23

## 2018-02-23 DIAGNOSIS — Z3A.40 40 WEEKS GESTATION OF PREGNANCY: Primary | ICD-10-CM

## 2018-02-23 NOTE — TELEPHONE ENCOUNTER
Msg left on  advising pt of overdue labs that need to be completed. Requested pt go to the lab to complete the blood work at her earliest convenience.

## 2018-02-24 ENCOUNTER — APPOINTMENT (OUTPATIENT)
Dept: OBGYN CLINIC | Facility: HOSPITAL | Age: 43
End: 2018-02-24
Payer: COMMERCIAL

## 2018-02-24 ENCOUNTER — HOSPITAL ENCOUNTER (OUTPATIENT)
Facility: HOSPITAL | Age: 43
Discharge: HOME | End: 2018-02-24
Attending: ADVANCED PRACTICE MIDWIFE | Admitting: OBSTETRICS & GYNECOLOGY
Payer: COMMERCIAL

## 2018-02-24 ENCOUNTER — ROUTINE PRENATAL (OUTPATIENT)
Dept: OBGYN CLINIC | Facility: CLINIC | Age: 43
End: 2018-02-24

## 2018-02-24 VITALS
BODY MASS INDEX: 31 KG/M2 | WEIGHT: 190 LBS | SYSTOLIC BLOOD PRESSURE: 127 MMHG | HEART RATE: 73 BPM | DIASTOLIC BLOOD PRESSURE: 87 MMHG

## 2018-02-24 DIAGNOSIS — Z34.03 ENCOUNTER FOR SUPERVISION OF NORMAL FIRST PREGNANCY IN THIRD TRIMESTER: Primary | ICD-10-CM

## 2018-02-24 PROCEDURE — 81002 URINALYSIS NONAUTO W/O SCOPE: CPT | Performed by: ADVANCED PRACTICE MIDWIFE

## 2018-02-24 PROCEDURE — 59025 FETAL NON-STRESS TEST: CPT | Performed by: ADVANCED PRACTICE MIDWIFE

## 2018-02-24 NOTE — NST
Nonstress Test   Patient: Ignacio Coral    Gestation: 40w4d    NST: AMA, post dates       Variability: Moderate           Accelerations: Yes           Decelerations: None            Baseline: 130 BPM           Uterine Irritability: No           Cont

## 2018-02-26 ENCOUNTER — TELEPHONE (OUTPATIENT)
Dept: OBGYN CLINIC | Facility: CLINIC | Age: 43
End: 2018-02-26

## 2018-02-26 ENCOUNTER — ROUTINE PRENATAL (OUTPATIENT)
Dept: OBGYN CLINIC | Facility: CLINIC | Age: 43
End: 2018-02-26

## 2018-02-26 VITALS
WEIGHT: 191 LBS | BODY MASS INDEX: 31 KG/M2 | SYSTOLIC BLOOD PRESSURE: 126 MMHG | DIASTOLIC BLOOD PRESSURE: 83 MMHG | HEART RATE: 80 BPM

## 2018-02-26 DIAGNOSIS — Z34.03 ENCOUNTER FOR SUPERVISION OF NORMAL FIRST PREGNANCY IN THIRD TRIMESTER: Primary | ICD-10-CM

## 2018-02-26 PROCEDURE — 81002 URINALYSIS NONAUTO W/O SCOPE: CPT | Performed by: ADVANCED PRACTICE MIDWIFE

## 2018-02-26 NOTE — PROGRESS NOTES
Active baby. Discussed IOL procedures in detail. Desires to try homeopathic and castor oil. Discussed danger signs.

## 2018-02-26 NOTE — TELEPHONE ENCOUNTER
Pt has been doing acupuncture, pineapple, nipple stim. Wants to know if she should start back/blue cohosh. Offered appt for cerv sweep & to discuss w/ cnm. Pt agreed but would like cnm answer before appt just in case she needs to .  Will talk to [facility]

## 2018-02-26 NOTE — TELEPHONE ENCOUNTER
Per MBW pt can have conversation & sweep at appt today. Advised pt.  Pt verbalized an understanding & agrees w/ plan

## 2018-02-27 ENCOUNTER — HOSPITAL ENCOUNTER (OUTPATIENT)
Dept: OBGYN CLINIC | Facility: HOSPITAL | Age: 43
Discharge: HOME | End: 2018-02-27
Payer: COMMERCIAL

## 2018-02-27 ENCOUNTER — HOSPITAL ENCOUNTER (INPATIENT)
Facility: HOSPITAL | Age: 43
LOS: 4 days | Discharge: HOME | End: 2018-03-03
Attending: ADVANCED PRACTICE MIDWIFE | Admitting: OBSTETRICS & GYNECOLOGY
Payer: COMMERCIAL

## 2018-02-27 LAB
ANTIBODY SCREEN: NEGATIVE
BASOPHILS # BLD: 0 K/UL (ref 0–0.2)
BASOPHILS NFR BLD: 0 %
EOSINOPHIL # BLD: 0.1 K/UL (ref 0–0.7)
EOSINOPHIL NFR BLD: 1 %
ERYTHROCYTE [DISTWIDTH] IN BLOOD BY AUTOMATED COUNT: 12.9 % (ref 11–15)
HCT VFR BLD AUTO: 34.8 % (ref 35–48)
HGB BLD-MCNC: 12 G/DL (ref 12–16)
HIV1+2 AB SPEC QL IA.RAPID: NONREACTIVE
LYMPHOCYTES # BLD: 2.2 K/UL (ref 1–4)
LYMPHOCYTES NFR BLD: 20 %
MCH RBC QN AUTO: 33.6 PG (ref 27–32)
MCHC RBC AUTO-ENTMCNC: 34.5 G/DL (ref 32–37)
MCV RBC AUTO: 97.5 FL (ref 80–100)
MONOCYTES # BLD: 0.8 K/UL (ref 0–1)
MONOCYTES NFR BLD: 8 %
NEUTROPHILS # BLD AUTO: 7.9 K/UL (ref 1.8–7.7)
NEUTROPHILS NFR BLD: 72 %
PLATELET # BLD AUTO: 223 K/UL (ref 140–400)
PMV BLD AUTO: 10 FL (ref 7.4–10.3)
RBC # BLD AUTO: 3.57 M/UL (ref 3.7–5.4)
RH BLOOD TYPE: POSITIVE
WBC # BLD AUTO: 11 K/UL (ref 4–11)

## 2018-02-27 RX ORDER — SODIUM CHLORIDE 0.9 % (FLUSH) 0.9 %
2 SYRINGE (ML) INJECTION EVERY 8 HOURS
Status: DISCONTINUED | OUTPATIENT
Start: 2018-02-27 | End: 2018-03-01

## 2018-02-27 RX ORDER — 0.9 % SODIUM CHLORIDE 0.9 %
VIAL (ML) INJECTION
Status: DISPENSED
Start: 2018-02-27 | End: 2018-02-28

## 2018-02-27 RX ORDER — SODIUM CHLORIDE 0.9 % (FLUSH) 0.9 %
2 SYRINGE (ML) INJECTION AS NEEDED
Status: DISCONTINUED | OUTPATIENT
Start: 2018-02-27 | End: 2018-03-01

## 2018-02-28 PROBLEM — O48.0 POST TERM PREGNANCY OVER 40 WEEKS: Status: ACTIVE | Noted: 2018-02-28

## 2018-02-28 LAB — T PALLIDUM AB SER QL: NEGATIVE

## 2018-02-28 PROCEDURE — 59200 INSERT CERVICAL DILATOR: CPT | Performed by: ADVANCED PRACTICE MIDWIFE

## 2018-02-28 RX ORDER — LIDOCAINE HYDROCHLORIDE 10 MG/ML
30 INJECTION, SOLUTION EPIDURAL; INFILTRATION; INTRACAUDAL; PERINEURAL ONCE
Status: DISCONTINUED | OUTPATIENT
Start: 2018-02-28 | End: 2018-02-28 | Stop reason: HOSPADM

## 2018-02-28 RX ORDER — AMMONIA INHALANTS 0.04 G/.3ML
0.3 INHALANT RESPIRATORY (INHALATION) AS NEEDED
Status: DISCONTINUED | OUTPATIENT
Start: 2018-02-28 | End: 2018-02-28 | Stop reason: HOSPADM

## 2018-02-28 RX ORDER — SODIUM CHLORIDE, SODIUM LACTATE, POTASSIUM CHLORIDE, CALCIUM CHLORIDE 600; 310; 30; 20 MG/100ML; MG/100ML; MG/100ML; MG/100ML
INJECTION, SOLUTION INTRAVENOUS CONTINUOUS
Status: DISCONTINUED | OUTPATIENT
Start: 2018-02-28 | End: 2018-03-01

## 2018-02-28 RX ORDER — TRISODIUM CITRATE DIHYDRATE AND CITRIC ACID MONOHYDRATE 500; 334 MG/5ML; MG/5ML
30 SOLUTION ORAL AS NEEDED
Status: DISCONTINUED | OUTPATIENT
Start: 2018-02-28 | End: 2018-02-28 | Stop reason: HOSPADM

## 2018-02-28 RX ORDER — DEXTROSE, SODIUM CHLORIDE, SODIUM LACTATE, POTASSIUM CHLORIDE, AND CALCIUM CHLORIDE 5; .6; .31; .03; .02 G/100ML; G/100ML; G/100ML; G/100ML; G/100ML
125 INJECTION, SOLUTION INTRAVENOUS CONTINUOUS
Status: DISCONTINUED | OUTPATIENT
Start: 2018-02-28 | End: 2018-02-28 | Stop reason: HOSPADM

## 2018-02-28 RX ORDER — TERBUTALINE SULFATE 1 MG/ML
0.25 INJECTION, SOLUTION SUBCUTANEOUS AS NEEDED
Status: DISCONTINUED | OUTPATIENT
Start: 2018-02-28 | End: 2018-02-28 | Stop reason: HOSPADM

## 2018-02-28 RX ORDER — SODIUM CHLORIDE, SODIUM LACTATE, POTASSIUM CHLORIDE, CALCIUM CHLORIDE 600; 310; 30; 20 MG/100ML; MG/100ML; MG/100ML; MG/100ML
INJECTION, SOLUTION INTRAVENOUS
Status: COMPLETED
Start: 2018-02-28 | End: 2018-02-28

## 2018-02-28 RX ORDER — IBUPROFEN 600 MG/1
600 TABLET ORAL ONCE AS NEEDED
Status: DISCONTINUED | OUTPATIENT
Start: 2018-02-28 | End: 2018-02-28 | Stop reason: HOSPADM

## 2018-02-28 NOTE — H&P
[facility] Patient Status:  Inpatient    1975 MRN [id number]   Location [address] [place] [address] Attending [address] Day # 1 Gerry Horner MD Rash  Medications:    Prescriptions Prior to Admission:  Docosahexaenoic Acid (DHA OR) Take by mouth. Disp:  Rfl:  2/27/2018 at Unknown time   Probiotic Product (PROBIOTIC OR) Take by mouth.  Disp:  Rfl:  2/27/2018 at Unknown time   Prenatal Vit-DSS-Fe Cbn- 5. 33 uIU/mL 10/05/17 0932    HCV Nonreactive   Nonreactive 10/05/17 0932    Pap Smear        GC DNA        Chlamydia DNA        GTT 1 Hr 108 mg/dL   mg/dL 10/05/17 0932    Glucose Fasting        Glucose 1 Hr        Glucose 2 Hr        Glucose 3 Hr        H 07/21/17 0855    Cystic Fibrosis[165] (Required questions in OE to answer) 0 variants   07/21/17 0855    Cystic Fibrosis[165] (Required questions in OE to answer) Not Applicable   26/16/88 0748    Sickle Cell        24Hr Urine Protein        24Hr Urine Cr procedures and expectations were discussed in detail. All questions answered, all appropriate consents will be signed at the Hospital. Admission is planned for today.    Advised patient that Pitocin usually used in conjunction with Cook's catheter - she

## 2018-02-28 NOTE — PROGRESS NOTES
Cooks catheter placed @ 0000 by Bart Stauffer and Hola Bentley nurse midwife student. 60 cc of NS placed in both balloons.  SVE before insertion 1/40/-1    Payton Chirinos, 02/28/18, 12:30 AM

## 2018-02-28 NOTE — PROGRESS NOTES
[facility]    Labor Progress Note    Shayy Neumann Patient Status:  Inpatient    1975 MRN [id number]   Location [address]  Attending LINDA Lovell   Hosp Day # 1 PCP Cece Combs CNM tracing      2) Depression        Plan discussed with patient who verbalizes understanding and agreement.     David Simon  2/28/2018

## 2018-02-28 NOTE — PROGRESS NOTES
[facility]    Labor Progress Note    Priscilla Mendiola Patient Status:  Inpatient    1975 MRN [id number]   Location [address]  Attending Jilda Kawasaki, APN   Hosp Day # 1 PCP Fede Holman CNM

## 2018-03-01 PROCEDURE — 0HQ9XZZ REPAIR PERINEUM SKIN, EXTERNAL APPROACH: ICD-10-PCS | Performed by: ADVANCED PRACTICE MIDWIFE

## 2018-03-01 RX ORDER — SIMETHICONE 80 MG
80 TABLET,CHEWABLE ORAL 3 TIMES DAILY PRN
Status: DISCONTINUED | OUTPATIENT
Start: 2018-03-01 | End: 2018-03-03

## 2018-03-01 RX ORDER — HYDROCODONE BITARTRATE AND ACETAMINOPHEN 5; 325 MG/1; MG/1
2 TABLET ORAL EVERY 4 HOURS PRN
Status: DISCONTINUED | OUTPATIENT
Start: 2018-03-01 | End: 2018-03-03

## 2018-03-01 RX ORDER — AMMONIA INHALANTS 0.04 G/.3ML
0.3 INHALANT RESPIRATORY (INHALATION) AS NEEDED
Status: DISCONTINUED | OUTPATIENT
Start: 2018-03-01 | End: 2018-03-03

## 2018-03-01 RX ORDER — HYDROCODONE BITARTRATE AND ACETAMINOPHEN 5; 325 MG/1; MG/1
1 TABLET ORAL EVERY 4 HOURS PRN
Status: DISCONTINUED | OUTPATIENT
Start: 2018-03-01 | End: 2018-03-03

## 2018-03-01 RX ORDER — PRENATAL VIT,CAL 76/IRON/FOLIC 29 MG-1 MG
1 TABLET ORAL DAILY
Status: DISCONTINUED | OUTPATIENT
Start: 2018-03-01 | End: 2018-03-03

## 2018-03-01 RX ORDER — IBUPROFEN 600 MG/1
600 TABLET ORAL EVERY 6 HOURS
Status: DISCONTINUED | OUTPATIENT
Start: 2018-03-01 | End: 2018-03-03

## 2018-03-01 RX ORDER — ACETAMINOPHEN 325 MG/1
650 TABLET ORAL EVERY 4 HOURS PRN
Status: DISCONTINUED | OUTPATIENT
Start: 2018-03-01 | End: 2018-03-03

## 2018-03-01 RX ORDER — ONDANSETRON 2 MG/ML
4 INJECTION INTRAMUSCULAR; INTRAVENOUS EVERY 6 HOURS PRN
Status: DISCONTINUED | OUTPATIENT
Start: 2018-03-01 | End: 2018-03-03

## 2018-03-01 RX ORDER — DIAPER,BRIEF,INFANT-TODD,DISP
1 EACH MISCELLANEOUS EVERY 6 HOURS PRN
Status: DISCONTINUED | OUTPATIENT
Start: 2018-03-01 | End: 2018-03-03

## 2018-03-01 RX ORDER — BISACODYL 10 MG
10 SUPPOSITORY, RECTAL RECTAL ONCE AS NEEDED
Status: DISCONTINUED | OUTPATIENT
Start: 2018-03-01 | End: 2018-03-03

## 2018-03-01 RX ORDER — DOCUSATE SODIUM 100 MG/1
100 CAPSULE, LIQUID FILLED ORAL 2 TIMES DAILY
Status: DISCONTINUED | OUTPATIENT
Start: 2018-03-01 | End: 2018-03-03

## 2018-03-01 NOTE — PROGRESS NOTES
[facility]OSP - [facility]    Labor Progress Note    Yvonnie Bamberger Patient Status:  Inpatient    1975 MRN [id number]   Location [address]  Attending LINDA Dodson   Hosp Day # 2 PCP Mark Anthony Cleveland CNM Philip Degroot  3/1/2018

## 2018-03-01 NOTE — LACTATION NOTE
This note was copied from a baby's chart.   LACTATION NOTE - INFANT    Evaluation Type  Evaluation Type: Inpatient    Problems & Assessment  Problems Diagnosed or Identified: Sleepy    Feeding Assessment  Summary Current Feeding: Adlib;Breastfeeding exclusi

## 2018-03-01 NOTE — PROGRESS NOTES
Patient received into room 362 via wheelchair . Bedside report received from FAITH Morris.  Bed in locked and low position.  Side rails up x2.  Vitals signs within normal limits, fundus firm at U/U, lochia small, no clots noted.  Baby boy  present at beside,

## 2018-03-01 NOTE — PROGRESS NOTES
[facility]    OB/GYNE Progress Note      Westly Bushy Patient Status:  Inpatient    1975 MRN [id number]   Location [address]  Attending LINDA Perdue   Hosp Day # 1 PCP Moni Armijo CNM 07/21/2017   BLOODURINE Negative 07/21/2017   NITRITE NEG 02/26/2018   UROBILINOGEN <2.0 07/21/2017   JORDEN Jones (A) 07/21/2017   UASA Negative 07/21/2017            Assessment/Plan     Post term pregnancy over 40 weeks      Depression    Induction of lab

## 2018-03-01 NOTE — PLAN OF CARE
Problem: POSTPARTUM  Goal: Optimize infant feeding at the breast  INTERVENTIONS:  - Initiate breast feeding within first hour after birth. - Monitor effectiveness of current breast feeding efforts. - Assess support systems available to mother/family.   - for baby q1h until waking.

## 2018-03-01 NOTE — L&D DELIVERY NOTE
Steve Gannon  [[id number]]     Labor Events     labor?:  No    steroids?:  None   Cervical ripening date/time:  18 0000   Cervical ripening type:  Cervical Ripening Balloon   Antibiotics (enter # doses in comment):  none   Rupture date status:  Living   Apgar Scoring Key:     0 1 2    Skin color Blue or pale Acrocyanotic Completely pink    Heart rate Absent <100 bpm >100 bpm    Reflex irritability No response Grimace Cry or active withdrawal    Muscle tone Limp Some flexion Active motion

## 2018-03-01 NOTE — LACTATION NOTE
LACTATION NOTE - MOTHER      Evaluation Type: Inpatient    Problems identified  Problems identified: Knowledge deficit    Maternal history  Maternal history: Anxiety;Depression  Other/comment: fibroid    Breastfeeding goal  Breastfeeding goal: To maintain

## 2018-03-01 NOTE — PLAN OF CARE
Problem: POSTPARTUM  Goal: Experiences normal breast weaning course  INTERVENTIONS:  - Assess for and manage engorgement. - Instruct on breast care. - Provide comfort measures.   Outcome: Progressing

## 2018-03-01 NOTE — PROGRESS NOTES
PT C/O OF FEELING LIGHTHEADED AND HEART RACING WHILE UP TO BATHROOM. BLEEDING STABLE, HOWEVER, CLOTS NOTED IN TOILET. PT UNABLE TO VOID. PLACED BACK IN BED, VSS. STRAIGHT CATH COMPLETED WITH 500 ML OUT. UTERUS REMAINS U/1, MIDLINE, AND FIRM.   PT FEELS

## 2018-03-02 PROBLEM — D64.9 ANEMIA: Status: ACTIVE | Noted: 2018-03-02

## 2018-03-02 LAB
ERYTHROCYTE [DISTWIDTH] IN BLOOD BY AUTOMATED COUNT: 13.1 % (ref 11–15)
HCT VFR BLD AUTO: 28.9 % (ref 35–48)
HGB BLD-MCNC: 9.8 G/DL (ref 12–16)
MCH RBC QN AUTO: 33.6 PG (ref 27–32)
MCHC RBC AUTO-ENTMCNC: 33.9 G/DL (ref 32–37)
MCV RBC AUTO: 99 FL (ref 80–100)
PLATELET # BLD AUTO: 189 K/UL (ref 140–400)
PMV BLD AUTO: 9.9 FL (ref 7.4–10.3)
RBC # BLD AUTO: 2.92 M/UL (ref 3.7–5.4)
WBC # BLD AUTO: 15 K/UL (ref 4–11)

## 2018-03-02 RX ORDER — MELATONIN
325 2 TIMES DAILY WITH MEALS
Status: DISCONTINUED | OUTPATIENT
Start: 2018-03-02 | End: 2018-03-03

## 2018-03-02 NOTE — PROGRESS NOTES
[facility]    OB/GYNE Progress Note      Silvina Bates Patient Status:  Inpatient    1975 MRN [id number]   Location [facility] 3SE Attending Candance Ochoa, APN   Hosp Day # 3 PCP Dimitris Bone CNM         Subject NITRITE NEG 2018   UROBILINOGEN <2.0 2017   JORDEN Large (A) 2017   UASA Negative 2017       Assessment/Plan   40 you  day 1 PP  S/p   Anemia     Plan discussed with patient who verbalizes understanding and agreement.   Disch

## 2018-03-03 VITALS
OXYGEN SATURATION: 100 % | HEART RATE: 75 BPM | TEMPERATURE: 98 F | RESPIRATION RATE: 16 BRPM | DIASTOLIC BLOOD PRESSURE: 72 MMHG | BODY MASS INDEX: 30.7 KG/M2 | HEIGHT: 66 IN | SYSTOLIC BLOOD PRESSURE: 121 MMHG | WEIGHT: 191 LBS

## 2018-03-03 RX ORDER — PSEUDOEPHEDRINE HCL 30 MG
100 TABLET ORAL 2 TIMES DAILY PRN
Qty: 60 CAPSULE | Refills: 1 | Status: SHIPPED | OUTPATIENT
Start: 2018-03-03 | End: 2018-04-16

## 2018-03-03 RX ORDER — MELATONIN
325 2 TIMES DAILY WITH MEALS
Qty: 60 TABLET | Refills: 2 | Status: SHIPPED | OUTPATIENT
Start: 2018-03-03 | End: 2018-04-16

## 2018-03-03 NOTE — PROGRESS NOTES
[facility]    OB/GYNE Progress Note      Tiana Ring Patient Status:  Inpatient    1975 MRN [id number]   Location [facility] 3SE Attending LINDA Meadows   Hosp Day # 4 PCP NAOMY Danielsmen Results  Component Value Date   TREPONEMALAB Negative 02/27/2018   RAPIDHIVSCRN Nonreactive 02/27/2018   HBVSAG Nonreactive  07/21/2017   ABO A 02/27/2018   RH Positive 02/27/2018   WBC 15.0 (H) 03/02/2018   HGB 9.8 (L) 03/02/2018   HCT 28.9 (L) 03/02/2018

## 2018-03-03 NOTE — DISCHARGE SUMMARY
[facility]    Discharge Summary    Caryle Crisp Patient Status:  Inpatient    1975 MRN [id number]   Location [facility] 3SE Attending LINDA Zayas   [facility] Day # 4       Delivering OB Clinician: Alannah Jay

## 2018-03-05 NOTE — PROGRESS NOTES
Richmond Neal  Integrative Medicine         Patient is 39 weeks pregnant and due on Tuesday the 20th. She is only 1 cm dilated. She was treated for anxiety and depression. Sleep is difficult because she cannot \"turn off her mind\". Objective:   Ton

## 2018-03-05 NOTE — PAYOR COMM NOTE
--------------  DISCHARGE REVIEW    Payor: TRACIE LANGE  Subscriber #:  [id number]  Authorization Number: [id number]    Admit date: 2/27/18  Admit time:  2208  Discharge Date: 3/3/2018  2:22 PM     Admitting Physician: Chelsea Castellano MD  Attending Phys

## 2018-03-05 NOTE — PROGRESS NOTES
Denise Donnabri  Integrative Medicine         Patient is 39 weeks pregnant and due on Tuesday the 20th. She is only 1 cm dilated. She was treated for anxiety and depression. Sleep is difficult because she cannot \"turn off her mind\". Objective:   Ton

## 2018-03-05 NOTE — PROGRESS NOTES
Andrew Castano  Integrative Medicine         Patient is 39 weeks pregnant and due on Tuesday the 20th. She is only 1 cm dilated. She was treated for anxiety and depression. Sleep is difficult because she cannot \"turn off her mind\". Objective:   Ton

## 2018-03-07 NOTE — ADDENDUM NOTE
Encounter addended by: Julieth Elizabeth CNM on: 3/7/2018  4:28 PM<BR>    Actions taken: Sign clinical note, Charge Capture section accepted

## 2018-03-09 ENCOUNTER — NURSE ONLY (OUTPATIENT)
Dept: LACTATION | Facility: HOSPITAL | Age: 43
End: 2018-03-09
Attending: ADVANCED PRACTICE MIDWIFE
Payer: COMMERCIAL

## 2018-03-09 DIAGNOSIS — O92.70 DISORDER OF LACTATION: Primary | ICD-10-CM

## 2018-03-09 PROCEDURE — 99212 OFFICE O/P EST SF 10 MIN: CPT

## 2018-03-09 NOTE — PROGRESS NOTES
Mom was having a problem with sore nipples and getting baby to burp. He is gaining weight well, no problems. He has at least 6 wets and 6 yellow stools per day. Baby is slightly jaundiced but mom says Ped is following.   She was using a nipple shield in

## 2018-03-15 ENCOUNTER — POSTPARTUM (OUTPATIENT)
Dept: OBGYN CLINIC | Facility: CLINIC | Age: 43
End: 2018-03-15

## 2018-03-15 VITALS
DIASTOLIC BLOOD PRESSURE: 60 MMHG | HEART RATE: 82 BPM | SYSTOLIC BLOOD PRESSURE: 110 MMHG | WEIGHT: 169 LBS | BODY MASS INDEX: 27 KG/M2

## 2018-03-16 NOTE — PROGRESS NOTES
HPI:   Erlinda Hernandez is a 38year old  who presents for a 2 week Postpartum visit. Reports adapting well and coping well. Breastfeeding successfully. No strain in relationship, partner supportive. Denies DV.       Labor and Delivery History: Medication. Depression is ongoing, pt's treats naturally.    • Disorder of lactation    • History of chicken pox age 12   • Postpartum Anemia 3/2/2018   • Visual impairment     wears glasses      Past Surgical History:  No date: ORAL SURGERY      Comment: w soft, nipples pink, no cracks or bleeding,   LUNGS: clear  ABDOMEN:  Soft, non-tender. Fundus at sp.   GYNE/: perineum intact  EXTREMITIES: no edema, NEG Homans    ASSESSMENT AND PLAN:   Advised to continue breastfeeding, discussed APNO if improvement no

## 2018-03-23 NOTE — PROGRESS NOTES
Alem Feldman  Integrative Medicine         Patient is getting more contractions. Objective:   Tongue: Pale swollen and scallops.   Pulse: CHELA Weak and thin SP excess and KD wiry and tight               HT is thin and weak LV slightly wiry KD wiry an

## 2018-04-02 ENCOUNTER — NURSE ONLY (OUTPATIENT)
Dept: LACTATION | Facility: HOSPITAL | Age: 43
End: 2018-04-02
Attending: ADVANCED PRACTICE MIDWIFE
Payer: COMMERCIAL

## 2018-04-02 DIAGNOSIS — O92.70 DISORDER OF LACTATION: Primary | ICD-10-CM

## 2018-04-02 PROCEDURE — 99212 OFFICE O/P EST SF 10 MIN: CPT

## 2018-04-16 ENCOUNTER — POSTPARTUM (OUTPATIENT)
Dept: OBGYN CLINIC | Facility: CLINIC | Age: 43
End: 2018-04-16

## 2018-04-16 VITALS
SYSTOLIC BLOOD PRESSURE: 116 MMHG | DIASTOLIC BLOOD PRESSURE: 74 MMHG | BODY MASS INDEX: 26 KG/M2 | WEIGHT: 162 LBS | HEART RATE: 58 BPM

## 2018-04-16 DIAGNOSIS — Z30.09 FAMILY PLANNING: ICD-10-CM

## 2018-04-16 PROCEDURE — 81025 URINE PREGNANCY TEST: CPT | Performed by: ADVANCED PRACTICE MIDWIFE

## 2018-04-16 RX ORDER — ACETAMINOPHEN AND CODEINE PHOSPHATE 120; 12 MG/5ML; MG/5ML
0.35 SOLUTION ORAL DAILY
Qty: 28 TABLET | Refills: 11 | Status: SHIPPED | OUTPATIENT
Start: 2018-04-16 | End: 2018-04-23

## 2018-04-16 NOTE — PROGRESS NOTES
Patient here for postpartum check-up. Vaginal delivery @ 6 weeks ago with NAOMY SANDERSON. Breastfeeding exclusively, on demand. Baby with adequate weight gain. Denies fevers, chills, body aches and flu-like symptoms.   Denies abdominal pain, no pelvic pain, repor

## 2018-04-23 ENCOUNTER — TELEPHONE (OUTPATIENT)
Dept: OBGYN CLINIC | Facility: CLINIC | Age: 43
End: 2018-04-23

## 2018-04-23 RX ORDER — ACETAMINOPHEN AND CODEINE PHOSPHATE 120; 12 MG/5ML; MG/5ML
0.35 SOLUTION ORAL DAILY
Qty: 28 TABLET | Refills: 11 | Status: SHIPPED | OUTPATIENT
Start: 2018-04-23 | End: 2018-05-21

## 2018-04-23 NOTE — TELEPHONE ENCOUNTER
Spoke with pt and advised Rx for bcp was sent on 4/16 to [address]. Pt states she thought she had changed her pharmacy and requested Rx for bcp to be sent to [facility] in [facility]. Rx sent. Pt agreed and voiced understanding.

## 2018-06-05 ENCOUNTER — LAB ENCOUNTER (OUTPATIENT)
Dept: LAB | Facility: HOSPITAL | Age: 43
End: 2018-06-05
Attending: INTERNAL MEDICINE
Payer: COMMERCIAL

## 2018-06-05 ENCOUNTER — OFFICE VISIT (OUTPATIENT)
Dept: INTERNAL MEDICINE CLINIC | Facility: CLINIC | Age: 43
End: 2018-06-05

## 2018-06-05 VITALS
WEIGHT: 158.88 LBS | HEIGHT: 66 IN | BODY MASS INDEX: 25.54 KG/M2 | SYSTOLIC BLOOD PRESSURE: 119 MMHG | HEART RATE: 58 BPM | TEMPERATURE: 98 F | DIASTOLIC BLOOD PRESSURE: 80 MMHG

## 2018-06-05 DIAGNOSIS — L29.9 PRURITUS: ICD-10-CM

## 2018-06-05 DIAGNOSIS — D50.9 IRON DEFICIENCY ANEMIA, UNSPECIFIED IRON DEFICIENCY ANEMIA TYPE: Primary | ICD-10-CM

## 2018-06-05 DIAGNOSIS — D50.9 IRON DEFICIENCY ANEMIA, UNSPECIFIED IRON DEFICIENCY ANEMIA TYPE: ICD-10-CM

## 2018-06-05 PROCEDURE — 99203 OFFICE O/P NEW LOW 30 MIN: CPT | Performed by: INTERNAL MEDICINE

## 2018-06-05 PROCEDURE — 82728 ASSAY OF FERRITIN: CPT

## 2018-06-05 PROCEDURE — 85025 COMPLETE CBC W/AUTO DIFF WBC: CPT

## 2018-06-05 PROCEDURE — 36415 COLL VENOUS BLD VENIPUNCTURE: CPT

## 2018-06-05 PROCEDURE — 84443 ASSAY THYROID STIM HORMONE: CPT

## 2018-06-05 PROCEDURE — 80053 COMPREHEN METABOLIC PANEL: CPT

## 2018-06-05 PROCEDURE — 84466 ASSAY OF TRANSFERRIN: CPT

## 2018-06-05 PROCEDURE — 99212 OFFICE O/P EST SF 10 MIN: CPT | Performed by: INTERNAL MEDICINE

## 2018-06-05 PROCEDURE — 83540 ASSAY OF IRON: CPT

## 2018-06-05 RX ORDER — PERMETHRIN 50 MG/G
1 CREAM TOPICAL ONCE
Qty: 1 BOTTLE | Refills: 0 | Status: SHIPPED | OUTPATIENT
Start: 2018-06-05 | End: 2018-06-05

## 2018-06-05 NOTE — PROGRESS NOTES
Kirby Torrez is a 38year old female. Patient presents with:  Itching: patient states she has been itchy all over her body x 7 days, no rash      HPI:     HPI. Patient is here with complaints of itching.  7 days ago. There is no rash.   Reports i 5/1/2017  Smokeless tobacco: Never Used                      Alcohol use: Yes           3.0 oz/week     Cans of beer: 5 per week       REVIEW OF SYSTEMS:   Review of Systems   Constitutional: Negative for chills, fever, malaise/fatigue and weight loss.    H anemia type. Hemoglobin of 9.8 postpartum. Was on iron supplements for 6 weeks. Patient having itching and pruritus, checking iron levels for iron deficiency. Denies any pica. Currently taking multivitamin. Recheck levels, if needed supplement iron.

## 2018-07-16 ENCOUNTER — OFFICE VISIT (OUTPATIENT)
Dept: FAMILY MEDICINE CLINIC | Facility: CLINIC | Age: 43
End: 2018-07-16

## 2018-07-16 VITALS
DIASTOLIC BLOOD PRESSURE: 72 MMHG | HEART RATE: 70 BPM | WEIGHT: 153 LBS | SYSTOLIC BLOOD PRESSURE: 110 MMHG | OXYGEN SATURATION: 99 % | BODY MASS INDEX: 24.59 KG/M2 | RESPIRATION RATE: 16 BRPM | HEIGHT: 66 IN

## 2018-07-16 DIAGNOSIS — Z00.00 ROUTINE MEDICAL EXAM: Primary | ICD-10-CM

## 2018-07-16 DIAGNOSIS — M75.22 BICEPS TENDONITIS ON LEFT: ICD-10-CM

## 2018-07-16 DIAGNOSIS — R53.82 CHRONIC FATIGUE: ICD-10-CM

## 2018-07-16 DIAGNOSIS — R79.0 LOW FERRITIN: ICD-10-CM

## 2018-07-16 PROCEDURE — 99396 PREV VISIT EST AGE 40-64: CPT | Performed by: FAMILY MEDICINE

## 2018-07-16 NOTE — PROGRESS NOTES
Ed Colon is a 38year old female. Patient presents with:  Physical      HPI:     Here to establish  Delivered 4.5 mos ago. Was seen in June for itching,  Did not take any of the recommendations. Itching has improved.     Has been back to work • Heart Attack Paternal Grandfather    • Mental Disorder Paternal Aunt      due to Rh factor       IMMUNIZATION HISTORY:     Immunization History   Administered Date(s) Administered   • Flulaval 0.5 ml 6 months & older (64445) 10/13/2017   • TDAP 11/29/201 Constitutional: She is oriented to person, place, and time and well-developed, well-nourished, and in no distress. No distress. HENT:   Head: Normocephalic and atraumatic.    Right Ear: External ear normal.   Left Ear: External ear normal.   Nose: Nose no Lipid Panel [E]      Comp Metabolic Panel (14) [E]    Patient Instructions   The products and items listed below (the “Products”)  and their claims have not been evaluated by the Food and Drug Administration.  Dietary products are not intended to treat, · Healthy diet including adequate intake of vegetables and fruits, appropriate portion sizes, minimizing highly concentrated carbohydrate foods      Patient affirmed understanding of plan and all questions were answered.      Keyla Tineo DO

## 2020-01-26 NOTE — TELEPHONE ENCOUNTER
Pia Lord CNM,    Please sign off on form:  -Highlight the patient and hit \"Chart\" button. -In Chart Review, w/in the Encounter tab - open the Telephone call encounter for_01/04/18.  Scroll down.  -Click \"scan on\" blue Hyperlink under [employer] Patient is a 74y old  Female who presents with a chief complaint of discoloration of toe of right foot.     HPI: This 74 year old non-diabetic female presents to the ED with complaint of discoloration for a toe on the right foot. She states that she was seen by her primary care physician a few days ago and told her to go to the emergency room.  Patient states she smokes at least a pack a day of cigarettes since she was 22 years old.  Patient states she can only ambulate 10 feet and gets a cramp in her right calf for which she needs to sit down.  She states she is very minimally ambulatory.   She denies pain at rest with her feet elevated.  Patient lives alone with a cat. Patient massaging leg throughout the visit.  Patient denies n/v/d/sob/cp/f.  Patient states her blood pressure is not usually low.     Podiatry Interval HPI:  Patient seen bedside this AM examined with attending.  Patient was awake and alert.  Patients foot continues to demarcate indicating level of salvage following open bypass.  Discussed with patient the plan for transmetatarsal amputation on Wednesday pending OR availability. Patient denies n/f/d/f/sob.      PMH: COPD (chronic obstructive pulmonary disease)  Hypercholesterolemia  Myocardial infarct, old    Allergies: PC Pen VK (Rash)  penicillin (Other; Rash)  statins (Other)  sulfa drugs (Other)  sulfamethizole (Other)    MEDICATIONS  (STANDING):  aspirin  chewable 81 milliGRAM(s) Oral daily  cefepime   IVPB 2000 milliGRAM(s) IV Intermittent every 12 hours  gabapentin 100 milliGRAM(s) Oral every 12 hours  heparin  Infusion. 800 Unit(s)/Hr (8 mL/Hr) IV Continuous <Continuous>  metoprolol tartrate 25 milliGRAM(s) Oral two times a day  metroNIDAZOLE  IVPB 500 milliGRAM(s) IV Intermittent every 8 hours  povidone iodine 10% Solution 1 Application(s) Topical daily  risperiDONE   Tablet 0.5 milliGRAM(s) Oral two times a day  senna 2 Tablet(s) Oral at bedtime    FH:  PSX: S/P CABG x 1    Social History:  pt states she smokes 1ppd since she was 22  denies etoh or substance use (10 Angel 2020 21:52)      Labs                        10.2   9.87  )-----------( 241      ( 26 Jan 2020 08:02 )             31.4     01-26    139  |  106  |  21<H>  ----------------------------<  127<H>  3.6   |  30  |  0.69    Ca    8.1<L>      26 Jan 2020 08:02  Phos  1.7     01-26  Mg     2.0     01-26    TPro  5.7<L>  /  Alb  2.0<L>  /  TBili  0.4  /  DBili  x   /  AST  58<H>  /  ALT  20  /  AlkPhos  46  01-26    Vital Signs Last 24 Hrs  T(C): 37.5 (26 Jan 2020 05:20), Max: 37.6 (25 Jan 2020 22:07)  T(F): 99.5 (26 Jan 2020 05:20), Max: 99.6 (25 Jan 2020 22:07)  HR: 102 (26 Jan 2020 05:20) (98 - 108)  BP: 155/72 (26 Jan 2020 05:20) (104/53 - 155/72)  BP(mean): 63 (25 Jan 2020 14:00) (63 - 63)  RR: 18 (26 Jan 2020 05:20) (18 - 20)  SpO2: 95% (26 Jan 2020 05:20) (95% - 99%)  Sedimentation Rate, Erythrocyte: 60 mm/Hr (01-22-20 @ 06:32)  Hemoglobin A1C, Whole Blood: 5.6 % (01-11-20 @ 09:30)       C-Reactive Protein, Serum: 1.80 mg/dL (01-22-20 @ 09:57)    WBC Count: 9.87 K/uL (01-26-20 @ 08:02)  WBC Count: 11.63 K/uL <H> (01-26-20 @ 03:55)            PHYSICAL EXAM  GEN: MERARY MEYER is a pleasant well-nourished, well developed 74y Female in no acute distress. Refused exam this AM, will attempt at later time  Per previous examination:   LE Focused:    Vasc:  DP and PT pulses non-palpable b/l. Left DP faintly dopplerable. Unable to doppler left PT, Unable to find right DP or PT with doppler. No CFT to right 1st, 2nd, 3rd, or 4th toes. Shiny taught skin b/l dorsal feet with level of violaceous color extending proximally to tarsal joints. Overall perfusion to foot notably improved, right foot is warm to touch.  Derm: Area of ischemia remains the stable.   Right 2nd toe: black necrotic to the level of the proximal with exposed bone.  Distal aspect of the right 2nd toe is hard and dry, no drainage to the right 2nd toe.  Right foot erythema to level just distal to ankle joint. Right 1st, 3rd, and 4th toes are necrotic and demarcated.  No drainage, no malodor, no signs of infection.  Neuro: Sensation diminished to digits b/l.  MSK: Tenderness to palpation distal foot.  Pain to touch dorsal right foot.     Imaging:   < from: Xray Foot AP + Lateral + Oblique, Right (01.10.20 @ 15:05) >    EXAM:  FOOT RIGHT (MINIMUM 3 VIEWS)                        PROCEDURE DATE:  01/10/2020    INTERPRETATION:  Right foot. 3 views. Patient has local pain.    The foot shows mild degeneration at the first MTP joint.    There is an old fracture deformity of the distal fibula.    No bone destruction or acute fracture is evident.    IMPRESSION: No acute finding.    JASKARAN MOSLEY M.D., ATTENDING RADIOLOGIST  This document has been electronically signed. Angel 10 2020  3:06PM  < end of copied text >    < from: [facility] Physiol Extremity Lower 3+ Level, BI (01.13.20 @ 13:49) >    EXAM:  US PHYSIOL LWR EXT 3+ LEV BI                        PROCEDURE DATE:  01/13/2020    INTERPRETATION:  Clinical indication: Right toe gangrene    Comparison: None    FINDINGS AND   IMPRESSION: Segmental pressures could not be obtaineddue to patient discomfort. Therefore, ABIs could not be calculated.    There are biphasic waveforms in the lower left thigh. Abnormally, monophasic flow within the remainder of the lower extremities.    RACQUEL CHA M.D., ATTENDING RADIOLOGIST  This document has been electronically signed. Jan 13 2020  2:20PM  < end of copied text >    < from: CT Angio Abd Aorta w/run-off w/ IV Cont (01.12.20 @ 19:40) >    EXAM:  CT ANGIO ABD AOR W RUN(W)AW IC                        PROCEDURE DATE:  01/12/2020    INTERPRETATION:  CT ANGIO ABDOMINAL AORTA RUNOFF WITHOUT AND OR WITH IV CONTRAST    CLINICAL INFORMATION: Atherosclerosis of the native arteries of the right and left lower extremity with right foot gangrene    PROCEDURE INFORMATION:   Exam: CTA Angiogram of the Abdominal Aorta and Bilateral Lower Extremities   (Run-off) With IV Contrast   Exam date and time: 1/12/2020 6:55 PM   Age: 74 years old   Clinical indication: Other: Pad, right 2nd toe gangrene, 3rd and 4th toes wet   early gang     TECHNIQUE:   Imaging protocol: CT angiogram of the abdominal aorta, pelvis and bilateral   lower extremities with IV iodinated contrast.   Intravenous contrast: 90 cc Omnipaque 350  3D rendering: MIP and/or 3D reconstructed images were created by the   technologist.     COMPARISON:   CR XR FOOT 3 VIEWS RIGHT 1/10/2020 2:46 PM     FINDINGS:   Aorta: Severe calcified and noncalcified atherosclerotic plaque. Aorta is   otherwise fully patent.   Celiac trunk and mesenteric arteries: No occlusion or significant stenosis.    Renal arteries: No occlusion or significant stenosis.      Right iliac arteries: Marked stenosis of the right external iliac artery distally.   Right femoral/popliteal arteries: Distal CFA stenosis. Severely and diffusely attenuated SFA. Occlusion of the mid-distal right superficial   femoral artery with reconstitution of the suprageniculate popliteal  artery. Stenotic right popliteal artery.   Right infrapopliteal arteries: Single-vessel runoff via the right peroneal   artery, diffusely attenuated, which feeds the plantar artery. The right anterior and posterior tibial   arteries are occluded. Reconstitution of the right pedis dorsalis artery at the   level of the foot, however severely attenuated.     Left iliac arteries: Moderate calcification and stenosis of the left iliac arteries. Occluded left internal iliac artery.  Left femoral/popliteal arteries: Long segmentOcclusion of the entire left superficial femoral   artery and Reconstitution of left popliteal artery, which shows multifocal disease without occlusion.  Left infrapopliteal arteries: Occlusion of the left anterior posterior tibial   arteries at the level of the ankle. Single-vessel left peroneal artery which   feeds the plantar artery. Left pedis dorsalis is attenuated but patent.    Liver: No mass.   Gallbladder and bile ducts: Unremarkable. No calcified stones. No ductal   dilation.    Pancreas: Unremarkable. No mass. No ductal dilation.    Spleen: Normal. No splenomegaly.    Adrenals: Normal. No mass.    Kidneys and ureters: Normal. No mass.      Stomach and bowel: . No obstruction. No mucosal thickening. Moderate stool   throughout the colon.    Appendix: No evidence of appendicitis.      Bladder: Unremarkable. No mass.    Reproductive: Unremarkable as visualized.      Intraperitoneal space: Unremarkable. No free air. No significant fluid   collection.    Lymph nodes: No lymphadenopathy.     Bones/joints: No acute fracture. No dislocation.    Soft tissues: Soft tissue irregularity of the second right toe presumably   related to underlying gangrene.       IMPRESSION:   1. Right lower extremity demonstrates marked diffuse stenosis of the lower   extremity with occlusion of the mid-distal right SFA which reconstitutes distally and   with single-vessel runoff to the right foot via the right peroneal artery which   feeds the plantar artery. The right pedis dorsalis artery reconstitutes at the   level of the ankle.   2. The left lower extremity demonstrates diffuse marked stenosis with total occlusion   of the left SFA with reconstitution of attenuated left popliteal artery. There is   single-vessel runoff via the left peronealartery is the which then feeds the   left plantar artery. The pedis dorsalis artery is patent.   3. evidence of inflow disease at the level of the right external iliac artery.  4. Slight irregularity of the soft tissues of the right second toe which may be   related to underlying gangrene.    GALILEA KING M.D., ATTENDING RADIOLOGIST  This document has been electronically signed. Jan 13 2020  4:12PM  < end of copied text >      A:   Critical Limb Ischemia, right  Dry Gangrene right 1st, 2nd, 3rd, 4th toes.  PVD  s/p angiogram 1/17  s/p open bypass iliac-pop RLE 1/23 with dr. shepard    P:  Patient evaluated, chart reviewed  Xrays- reviewed  NICKI/PVR-reviewed  CTA-reviewed- single vessel runoff to right foot.  4x4 gauze, prince to right foot digits distally to allow frequent doppler exam  Podiatry will continue to monitor and plan for surgical intervention pending vascular recommendations following bypass.  Patient's right foot dry gangrene continues to demarcate, however perfusion to foot notably improved  Plan for Transmetatarsal Amputation on Wednesday pending OR availability,   Discussed plan with Dr. Felix and he agreed  Examined with Dr. Slaughter and discussed with Dr. Welsh

## 2022-07-03 NOTE — ADDENDUM NOTE
Addended by: Jaki Postal on: 8/21/2017 09:06 AM     Modules accepted: Judy Encounter Date: 7/3/2022       History     Chief Complaint   Patient presents with    Headache     Patient was seen and dx with spinal leak a few days ago sent home and no improvement. +HA. +dizziness, +photosensitivity, +nausea, +pain      43-year-old female presents for headache.  Last Wednesday patient had epidural injections with pain management in her cervical and lumbar spine.  The following day she developed severe headache.  Seen in the ED and diagnosed with spinal post puncture headache.  Patient states symptoms never improved.  She is experiencing severe headache.  Occasionally frontal.  Occasionally posterior.  Migratory and waxing and waning.  Associated with nausea however no vomiting.  No fever or chills.  No neck stiffness.  Patient has facial paresthesias occasionally.  No focal weakness or numbness.  She is experiencing light and noise sensitivity.  No history of migraines.    The history is provided by the patient.     Review of patient's allergies indicates:  No Known Allergies  Past Medical History:   Diagnosis Date    Abnormal Pap smear of cervix     Depression      Past Surgical History:   Procedure Laterality Date    APPENDECTOMY      CERVICAL DISC SURGERY  2016     Family History   Problem Relation Age of Onset    No Known Problems Paternal Grandfather     No Known Problems Paternal Grandmother     No Known Problems Maternal Grandmother     Diabetes Maternal Grandfather     No Known Problems Father     Arrhythmia Mother         svt    Fibromyalgia Mother     No Known Problems Brother     No Known Problems Sister     Congenital heart disease Neg Hx     Pacemaker/defibrilator Neg Hx     Early death Neg Hx     Breast cancer Neg Hx     Colon cancer Neg Hx     Ovarian cancer Neg Hx      Social History     Tobacco Use    Smoking status: Never Smoker    Smokeless tobacco: Never Used   Substance Use Topics    Alcohol use: Yes     Comment: rare     Drug use: No     Review of  Systems   Constitutional: Negative for chills and fever.   HENT: Negative for congestion, ear pain, rhinorrhea and sore throat.    Eyes: Positive for photophobia. Negative for visual disturbance.   Respiratory: Negative for cough and shortness of breath.    Cardiovascular: Negative for chest pain.   Gastrointestinal: Positive for nausea. Negative for abdominal pain, diarrhea and vomiting.   Genitourinary: Negative for dysuria and hematuria.   Musculoskeletal: Negative for arthralgias and myalgias.   Skin: Negative for pallor and rash.   Neurological: Positive for dizziness and headaches. Negative for weakness and numbness.   All other systems reviewed and are negative.      Physical Exam     Initial Vitals [07/03/22 1453]   BP Pulse Resp Temp SpO2   137/73 82 20 98 °F (36.7 °C) 98 %      MAP       --         Physical Exam    Nursing note and vitals reviewed.  Constitutional: She appears well-developed and well-nourished. No distress.   HENT:   Head: Normocephalic and atraumatic.   Mouth/Throat: Oropharynx is clear and moist.   Eyes: Conjunctivae and EOM are normal. Pupils are equal, round, and reactive to light.   Neck: Neck supple.   Normal range of motion.  Cardiovascular: Normal rate, regular rhythm and intact distal pulses.   Pulmonary/Chest: No stridor. No respiratory distress.   Musculoskeletal:         General: Normal range of motion.      Cervical back: Normal range of motion and neck supple.      Comments: Moving all extremities with grossly equal strength     Neurological: She is alert and oriented to person, place, and time. GCS score is 15. GCS eye subscore is 4. GCS verbal subscore is 5. GCS motor subscore is 6.   CN 2-12 appear grossly intact.  Normal finger-to-nose bilaterally.  Normal heel-to-shin bilaterally.  No pronator drift facial asymmetry or speech difficulties.   Skin: Skin is warm and dry.   Psychiatric: She has a normal mood and affect.         ED Course   Procedures  Labs Reviewed   CBC W/  AUTO DIFFERENTIAL - Abnormal; Notable for the following components:       Result Value    Immature Granulocytes 0.6 (*)     Immature Grans (Abs) 0.06 (*)     All other components within normal limits   COMPREHENSIVE METABOLIC PANEL   SARS-COV-2 RDRP GENE   POCT URINE PREGNANCY          Imaging Results    None          Medications   sodium chloride 0.9% bolus 1,000 mL (0 mLs Intravenous Stopped 7/3/22 1826)   dexamethasone injection 12 mg (12 mg Intravenous Given 7/3/22 1706)   metoclopramide HCl injection 10 mg (10 mg Intravenous Given 7/3/22 1706)   diphenhydrAMINE injection 25 mg (25 mg Intravenous Given 7/3/22 1645)   ketorolac injection 15 mg (15 mg Intravenous Given 7/3/22 1705)   butalbital-acetaminophen-caffeine -40 mg per tablet 2 tablet (2 tablets Oral Given 7/3/22 1705)     Medical Decision Making:   History:   Old Medical Records: I decided to obtain old medical records.  Initial Assessment:   Uncomfortable but generally well-appearing, normal vitals, neurologically intact  Differential Diagnosis:   Post puncture headache, migraine, tension headache, no suspicion for infectious etiology  Clinical Tests:   Lab Tests: Ordered and Reviewed  The following lab test(s) were unremarkable: CBC and CMP  ED Management:  Patient re-evaluated after symptomatic therapies.  She states that she is not a 1000% better but symptoms are improved.  I will prescribe additional analgesics.  Recommend rest fluids and caffeine consumption.  Follow-up for worsening symptoms with the ED otherwise may follow-up with PCP                      Clinical Impression:   Final diagnoses:  [G97.1] Postdural puncture headache (Primary)          ED Disposition Condition    Discharge Stable        ED Prescriptions     Medication Sig Dispense Start Date End Date Auth. Provider    butalbital-acetaminophen-caffeine -40 mg (FIORICET, ESGIC) -40 mg per tablet Take 2 tablets by mouth every 6 (six) hours as needed for Headaches. 20  tablet 7/3/2022 8/2/2022 Jin Alonzo DO    naproxen (NAPROSYN) 500 MG tablet Take 1 tablet (500 mg total) by mouth 2 (two) times daily as needed (headache). 20 tablet 7/3/2022  Jin Alonzo DO    prochlorperazine (COMPAZINE) 10 MG tablet Take 1 tablet (10 mg total) by mouth every 6 (six) hours as needed (headache). 15 tablet 7/3/2022  Jin Alonzo DO        Follow-up Information     Follow up With Specialties Details Why Contact Info    Aga Shankar MD Internal Medicine Schedule an appointment as soon as possible for a visit in 2 days  [address]  [place] [address]  [phone]      [facility] Emergency Dept Emergency Medicine  If symptoms worsen [address] [facility] [address] [phone]           Jin Alonzo DO  07/03/22 1932

## 2023-01-09 NOTE — PROGRESS NOTES
Nipple stimulation via breast pump initiated- 5 min on/5 min off. Pt given lanolin cream.  Continuous monitoring via tele. Coagulation

## 2024-01-31 NOTE — PROGRESS NOTES
Dear Trip Brown,    It was a pleasure meeting you today at the Cardiology office. As we dicussed, your clinical condition is palpitations post ablation of an SVT plus exercise related chest pain. I recommended a Holter monitor for 1 week and a Nuclear Stress test to assess your chest pain.  Please follow up with me in the Cardiology office once your results are available so we can discuss them.    Sincerely,     Anthony Membreno MD     Sweeping of membranes done. Rev MFM ultrasound, BPP 8/8, TREY 12, Reactive NST. Rev SOL/when to call. FM counts tid  Advised to have repeat NST on Saturday and may ask to have membrane sweep again Sat or Mon if desires.   Continuing with Acupuncture

## 2024-06-23 NOTE — Clinical Note
Unclear etiology  ? Infection (UTI) vs worsening underlying dementia  Check urinalysis with reflex culture to rule out UTI  Check TSH  Follow blood culture  CT chest abdomen and pelvis   Weekly NST's at 34 weeks. Twice weekly testing at 38 weeks - weekly NST and weekly BPP. Delivery at 39-40 weeks.

## (undated) NOTE — Clinical Note
Follow-up Growth ultrasound at 32 weeks. Weekly NST's at 34 weeks. Twice weekly testing at 38 weeks - weekly NST and weekly BPP. Delivery at 39-40 weeks.

## (undated) NOTE — LETTER
VACCINE ADMINISTRATION RECORD  PARENT / GUARDIAN APPROVAL  Date: 2017  Vaccine administered to: Jan Rios     : 1975    MRN: [id number]    A copy of the appropriate Centers for Disease Control and Prevention Vaccine Information st

## (undated) NOTE — LETTER
11/29/2017              Emmett Cannon        [address] [address]         To Whom It May Concern,    Reyna Villarreal is currently under my care regarding her pregnancy.  She is currently 28 weeks and 1 day with an E

## (undated) NOTE — MR AVS SNAPSHOT
[address] at [address], [place] [address]  [phone]               Thank you for choosing us for your health care visit with Alistair Hilario MD.  We are glad to serve you and happy to provide you wi Wednesday June 21, 2017     Imaging:  US OB ABD APPROACH 1ST TRIMESTER <14 [facility] ONLY        Wednesday June 21, 2017     Imaging:  US OB TRANSVAGINAL ANY TRIMESTER EMG ONLY          Reason for Today's Visit     New Patient           Medical Issues Disc